# Patient Record
Sex: FEMALE | Race: WHITE | HISPANIC OR LATINO | URBAN - METROPOLITAN AREA
[De-identification: names, ages, dates, MRNs, and addresses within clinical notes are randomized per-mention and may not be internally consistent; named-entity substitution may affect disease eponyms.]

---

## 2024-06-24 DIAGNOSIS — G89.29 CHRONIC HEADACHE: Primary | ICD-10-CM

## 2024-06-24 DIAGNOSIS — G89.29 CHRONIC HEADACHES: Primary | ICD-10-CM

## 2024-06-24 DIAGNOSIS — R51.9 CHRONIC HEADACHES: Primary | ICD-10-CM

## 2024-06-24 DIAGNOSIS — R51.9 CHRONIC HEADACHE: Primary | ICD-10-CM

## 2024-07-08 ENCOUNTER — HOSPITAL ENCOUNTER (OUTPATIENT)
Dept: MRI IMAGING | Age: 44
Discharge: HOME | End: 2024-07-08
Attending: PSYCHIATRY & NEUROLOGY

## 2024-07-08 DIAGNOSIS — G89.29 CHRONIC HEADACHES: ICD-10-CM

## 2024-07-08 DIAGNOSIS — G89.29 CHRONIC HEADACHE: ICD-10-CM

## 2024-07-08 DIAGNOSIS — R51.9 CHRONIC HEADACHES: ICD-10-CM

## 2024-07-08 DIAGNOSIS — R51.9 CHRONIC HEADACHE: ICD-10-CM

## 2024-07-08 PROCEDURE — 70546 MR ANGIOGRAPH HEAD W/O&W/DYE: CPT

## 2024-07-08 PROCEDURE — A9585 GADOBUTROL INJECTION: HCPCS | Performed by: PSYCHIATRY & NEUROLOGY

## 2024-07-08 PROCEDURE — 10002805 HB CONTRAST AGENT: Performed by: PSYCHIATRY & NEUROLOGY

## 2024-07-08 PROCEDURE — 70553 MRI BRAIN STEM W/O & W/DYE: CPT

## 2024-07-08 RX ORDER — GADOBUTROL 604.72 MG/ML
7.5 INJECTION INTRAVENOUS ONCE
Status: COMPLETED | OUTPATIENT
Start: 2024-07-08 | End: 2024-07-08

## 2024-07-08 RX ADMIN — GADOBUTROL 7.5 ML: 604.72 INJECTION INTRAVENOUS at 15:19

## 2024-07-16 ENCOUNTER — LAB SERVICES (OUTPATIENT)
Dept: LAB | Age: 44
End: 2024-07-16

## 2024-07-16 DIAGNOSIS — G40.909 EPILEPSY, UNSPECIFIED, NOT INTRACTABLE, WITHOUT STATUS EPILEPTICUS  (CMD): Primary | ICD-10-CM

## 2024-07-16 LAB
ALBUMIN SERPL-MCNC: 3.8 G/DL (ref 3.6–5.1)
ALBUMIN/GLOB SERPL: 1.1 {RATIO} (ref 1–2.4)
ALP SERPL-CCNC: 66 UNITS/L (ref 45–117)
ALT SERPL-CCNC: 25 UNITS/L
ANION GAP SERPL CALC-SCNC: 7 MMOL/L (ref 7–19)
AST SERPL-CCNC: 21 UNITS/L
BASOPHILS # BLD: 0 K/MCL (ref 0–0.3)
BASOPHILS NFR BLD: 1 %
BILIRUB SERPL-MCNC: 0.8 MG/DL (ref 0.2–1)
BUN SERPL-MCNC: 16 MG/DL (ref 6–20)
BUN/CREAT SERPL: 26 (ref 7–25)
CALCIUM SERPL-MCNC: 9.1 MG/DL (ref 8.4–10.2)
CARBAMAZEPINE SERPL-MCNC: <0.5 MCG/ML (ref 4–12)
CHLORIDE SERPL-SCNC: 107 MMOL/L (ref 97–110)
CO2 SERPL-SCNC: 27 MMOL/L (ref 21–32)
CREAT SERPL-MCNC: 0.62 MG/DL (ref 0.51–0.95)
DEPRECATED RDW RBC: 40.9 FL (ref 39–50)
EGFRCR SERPLBLD CKD-EPI 2021: >90 ML/MIN/{1.73_M2}
EOSINOPHIL # BLD: 0.1 K/MCL (ref 0–0.5)
EOSINOPHIL NFR BLD: 2 %
ERYTHROCYTE [DISTWIDTH] IN BLOOD: 11.7 % (ref 11–15)
FASTING DURATION TIME PATIENT: ABNORMAL H
GLOBULIN SER-MCNC: 3.4 G/DL (ref 2–4)
GLUCOSE SERPL-MCNC: 81 MG/DL (ref 70–99)
HCT VFR BLD CALC: 39.8 % (ref 36–46.5)
HGB BLD-MCNC: 12.8 G/DL (ref 12–15.5)
IMM GRANULOCYTES # BLD AUTO: 0 K/MCL (ref 0–0.2)
IMM GRANULOCYTES # BLD: 0 %
LYMPHOCYTES # BLD: 1.4 K/MCL (ref 1–4.8)
LYMPHOCYTES NFR BLD: 30 %
MCH RBC QN AUTO: 30.8 PG (ref 26–34)
MCHC RBC AUTO-ENTMCNC: 32.2 G/DL (ref 32–36.5)
MCV RBC AUTO: 95.9 FL (ref 78–100)
MONOCYTES # BLD: 0.3 K/MCL (ref 0.3–0.9)
MONOCYTES NFR BLD: 6 %
NEUTROPHILS # BLD: 2.9 K/MCL (ref 1.8–7.7)
NEUTROPHILS NFR BLD: 61 %
NRBC BLD MANUAL-RTO: 0 /100 WBC
PLATELET # BLD AUTO: 243 K/MCL (ref 140–450)
POTASSIUM SERPL-SCNC: 4.1 MMOL/L (ref 3.4–5.1)
PROT SERPL-MCNC: 7.2 G/DL (ref 6.4–8.2)
RBC # BLD: 4.15 MIL/MCL (ref 4–5.2)
SODIUM SERPL-SCNC: 137 MMOL/L (ref 135–145)
WBC # BLD: 4.6 K/MCL (ref 4.2–11)

## 2024-07-16 PROCEDURE — 80156 ASSAY CARBAMAZEPINE TOTAL: CPT

## 2024-07-16 PROCEDURE — 85025 COMPLETE CBC W/AUTO DIFF WBC: CPT

## 2024-07-16 PROCEDURE — 80053 COMPREHEN METABOLIC PANEL: CPT

## 2024-07-16 PROCEDURE — 36415 COLL VENOUS BLD VENIPUNCTURE: CPT

## 2024-12-23 ENCOUNTER — TELEPHONE (OUTPATIENT)
Dept: OBGYN CLINIC | Facility: CLINIC | Age: 44
End: 2024-12-23

## 2024-12-23 DIAGNOSIS — N91.2 AMENORRHEA: Primary | ICD-10-CM

## 2024-12-23 DIAGNOSIS — O20.9 VAGINAL BLEEDING IN PREGNANCY, FIRST TRIMESTER (HCC): ICD-10-CM

## 2024-12-23 NOTE — TELEPHONE ENCOUNTER
Phoned patient to notify of lab order. States she will go in the next hour.     Monserrat Knight MD Rodarte, Patricia, RN    Ok to order beta hcg x2 and agree with ER precautions.

## 2024-12-23 NOTE — TELEPHONE ENCOUNTER
Patient calling to initiate prenatal care  LMP 11/6  Patient is 7-8 weeks on  1/1  Future Appointments   Date Time Provider Department Center   1/27/2025 12:30 PM [facility] US [facility] [address]   1/27/2025  3:00 PM Monserrat Knight MD [facility] [address]   2/28/2025  2:30 PM Diana Godinez APRN [facility] [place]       Confirmation of pregnancy appointment scheduled on   Insurance [facility]    Any history of ectopic pregnancy? no  Any history of miscarriage? 1 mc  Any medications that you are taking on a regular basis other than prenatal vitamins? no (if not taking prenatal vitamins, encourage patient to start taking.)  Any bleeding since the first day of last LMP and your positive pregnancy test? Some light spotting/bleeding started 1 week ago and three times since.

## 2024-12-23 NOTE — TELEPHONE ENCOUNTER
- Phoned patient who states she has experienced maroon blood noted when wiping 1-2 times both last week and again this week. Yesterday thicker clear fluid with maroon streaks x1.Denies pain and cramping. Notes excess nausea. States she did not experience any of this in previous pregnancies. ER precautions provided. Please advise.   -  us pended for approval.    Phoned number on file. Number belongs to patient's spouse as patient does not speak English. Spouse, Riley Roberson, provided patient's number so we may contact her directly. Updated to reflect her number.

## 2024-12-24 ENCOUNTER — TELEPHONE (OUTPATIENT)
Dept: OBGYN CLINIC | Facility: CLINIC | Age: 44
End: 2024-12-24

## 2024-12-24 LAB — HCG, TOTAL, QN: ABNORMAL MIU/ML

## 2024-12-24 NOTE — TELEPHONE ENCOUNTER
Pt's spouse is calling on his wife's behalf asking if there is anything she can take for her nausea and vomiting for two weeks. Pt spouse is aware about the upcoming appt for a  but is requesting if there is anything the pt can take prior. Wife only speaks Kyrgyz but the  prefers to be called instead. Please advise.

## 2024-12-24 NOTE — TELEPHONE ENCOUNTER
Returned call to patient to provide recommendations for nausea and vomiting in Japanese. ER precautions provided and patient to follow up if symptoms persist or worsen.     Reminded of need to repeat Hcg @ 48h.

## 2024-12-26 ENCOUNTER — TELEPHONE (OUTPATIENT)
Dept: OBGYN CLINIC | Facility: CLINIC | Age: 44
End: 2024-12-26

## 2024-12-26 DIAGNOSIS — O20.9 VAGINAL BLEEDING IN PREGNANCY, FIRST TRIMESTER (HCC): Primary | ICD-10-CM

## 2024-12-26 DIAGNOSIS — N91.2 AMENORRHEA: ICD-10-CM

## 2024-12-26 NOTE — TELEPHONE ENCOUNTER
New lab order placed for [facility]. Lab closed yesterday. Patient will go today.   Future Appointments   Date Time Provider Department Center   1/27/2025 12:30 PM [facility] US [facility] [address]   1/27/2025  3:00 PM Monserrat Knight MD [facility] [address]   2/28/2025  2:30 PM Diana Godinez APRN [facility] [place]

## 2024-12-26 NOTE — TELEPHONE ENCOUNTER
Pt's  called and pt has been told to have a 2nd HCG test 48 hrs after the 1st test (48 hrs was yesterday  and the lab was closed) went this morning and not order on file.

## 2024-12-27 LAB — HCG, TOTAL, QN: ABNORMAL MIU/ML

## 2024-12-30 ENCOUNTER — HOSPITAL ENCOUNTER (EMERGENCY)
Facility: HOSPITAL | Age: 44
Discharge: HOME | End: 2024-12-30
Attending: EMERGENCY MEDICINE
Payer: COMMERCIAL

## 2024-12-30 ENCOUNTER — APPOINTMENT (OUTPATIENT)
Dept: ULTRASOUND IMAGING | Facility: HOSPITAL | Age: 44
End: 2024-12-30
Attending: EMERGENCY MEDICINE
Payer: COMMERCIAL

## 2024-12-30 VITALS
RESPIRATION RATE: 16 BRPM | HEIGHT: 68.9 IN | HEART RATE: 72 BPM | WEIGHT: 154.31 LBS | SYSTOLIC BLOOD PRESSURE: 108 MMHG | BODY MASS INDEX: 22.85 KG/M2 | TEMPERATURE: 98 F | OXYGEN SATURATION: 100 % | DIASTOLIC BLOOD PRESSURE: 51 MMHG

## 2024-12-30 DIAGNOSIS — N89.8 VAGINAL DISCHARGE DURING PREGNANCY IN FIRST TRIMESTER (HCC): ICD-10-CM

## 2024-12-30 DIAGNOSIS — O46.8X1 SUBCHORIONIC HEMATOMA IN FIRST TRIMESTER, SINGLE OR UNSPECIFIED FETUS (HCC): ICD-10-CM

## 2024-12-30 DIAGNOSIS — O21.0 HYPEREMESIS GRAVIDARUM (HCC): Primary | ICD-10-CM

## 2024-12-30 DIAGNOSIS — O41.8X10 SUBCHORIONIC HEMATOMA IN FIRST TRIMESTER, SINGLE OR UNSPECIFIED FETUS (HCC): ICD-10-CM

## 2024-12-30 DIAGNOSIS — O26.891 VAGINAL DISCHARGE DURING PREGNANCY IN FIRST TRIMESTER (HCC): ICD-10-CM

## 2024-12-30 LAB
ALBUMIN SERPL-MCNC: 4.1 G/DL (ref 3.2–4.8)
ALBUMIN/GLOB SERPL: 1.7 {RATIO} (ref 1–2)
ALP LIVER SERPL-CCNC: 57 U/L
ALT SERPL-CCNC: 18 U/L
ANION GAP SERPL CALC-SCNC: 7 MMOL/L (ref 0–18)
AST SERPL-CCNC: 15 U/L (ref ?–34)
BASOPHILS # BLD AUTO: 0.04 X10(3) UL (ref 0–0.2)
BASOPHILS NFR BLD AUTO: 0.4 %
BILIRUB SERPL-MCNC: 0.5 MG/DL (ref 0.3–1.2)
BUN BLD-MCNC: 14 MG/DL (ref 9–23)
CALCIUM BLD-MCNC: 9.1 MG/DL (ref 8.7–10.4)
CHLORIDE SERPL-SCNC: 107 MMOL/L (ref 98–112)
CO2 SERPL-SCNC: 23 MMOL/L (ref 21–32)
CREAT BLD-MCNC: 0.63 MG/DL
EGFRCR SERPLBLD CKD-EPI 2021: 114 ML/MIN/1.73M2 (ref 60–?)
EOSINOPHIL # BLD AUTO: 0.14 X10(3) UL (ref 0–0.7)
EOSINOPHIL NFR BLD AUTO: 1.4 %
ERYTHROCYTE [DISTWIDTH] IN BLOOD BY AUTOMATED COUNT: 12.1 %
GLOBULIN PLAS-MCNC: 2.4 G/DL (ref 2–3.5)
GLUCOSE BLD-MCNC: 92 MG/DL (ref 70–99)
HCT VFR BLD AUTO: 37.5 %
HGB BLD-MCNC: 12.6 G/DL
IMM GRANULOCYTES # BLD AUTO: 0.09 X10(3) UL (ref 0–1)
IMM GRANULOCYTES NFR BLD: 0.9 %
LIPASE SERPL-CCNC: 36 U/L (ref 12–53)
LYMPHOCYTES # BLD AUTO: 1.72 X10(3) UL (ref 1–4)
LYMPHOCYTES NFR BLD AUTO: 16.7 %
MCH RBC QN AUTO: 31.1 PG (ref 26–34)
MCHC RBC AUTO-ENTMCNC: 33.6 G/DL (ref 31–37)
MCV RBC AUTO: 92.6 FL
MONOCYTES # BLD AUTO: 0.58 X10(3) UL (ref 0.1–1)
MONOCYTES NFR BLD AUTO: 5.6 %
NEUTROPHILS # BLD AUTO: 7.73 X10 (3) UL (ref 1.5–7.7)
NEUTROPHILS # BLD AUTO: 7.73 X10(3) UL (ref 1.5–7.7)
NEUTROPHILS NFR BLD AUTO: 75 %
OSMOLALITY SERPL CALC.SUM OF ELEC: 284 MOSM/KG (ref 275–295)
PLATELET # BLD AUTO: 260 10(3)UL (ref 150–450)
POTASSIUM SERPL-SCNC: 3.8 MMOL/L (ref 3.5–5.1)
PROT SERPL-MCNC: 6.5 G/DL (ref 5.7–8.2)
RBC # BLD AUTO: 4.05 X10(6)UL
RH BLOOD TYPE: POSITIVE
SODIUM SERPL-SCNC: 137 MMOL/L (ref 136–145)
WBC # BLD AUTO: 10.3 X10(3) UL (ref 4–11)

## 2024-12-30 PROCEDURE — 84702 CHORIONIC GONADOTROPIN TEST: CPT

## 2024-12-30 PROCEDURE — 85025 COMPLETE CBC W/AUTO DIFF WBC: CPT | Performed by: EMERGENCY MEDICINE

## 2024-12-30 PROCEDURE — 96374 THER/PROPH/DIAG INJ IV PUSH: CPT

## 2024-12-30 PROCEDURE — 83690 ASSAY OF LIPASE: CPT | Performed by: EMERGENCY MEDICINE

## 2024-12-30 PROCEDURE — 83690 ASSAY OF LIPASE: CPT

## 2024-12-30 PROCEDURE — 96361 HYDRATE IV INFUSION ADD-ON: CPT

## 2024-12-30 PROCEDURE — 99284 EMERGENCY DEPT VISIT MOD MDM: CPT

## 2024-12-30 PROCEDURE — 87591 N.GONORRHOEAE DNA AMP PROB: CPT | Performed by: EMERGENCY MEDICINE

## 2024-12-30 PROCEDURE — 99285 EMERGENCY DEPT VISIT HI MDM: CPT

## 2024-12-30 PROCEDURE — 87491 CHLMYD TRACH DNA AMP PROBE: CPT | Performed by: EMERGENCY MEDICINE

## 2024-12-30 PROCEDURE — 80053 COMPREHEN METABOLIC PANEL: CPT | Performed by: EMERGENCY MEDICINE

## 2024-12-30 PROCEDURE — 76801 OB US < 14 WKS SINGLE FETUS: CPT | Performed by: EMERGENCY MEDICINE

## 2024-12-30 PROCEDURE — 81514 NFCT DS BV&VAGINITIS DNA ALG: CPT | Performed by: EMERGENCY MEDICINE

## 2024-12-30 PROCEDURE — 86900 BLOOD TYPING SEROLOGIC ABO: CPT | Performed by: EMERGENCY MEDICINE

## 2024-12-30 PROCEDURE — 80053 COMPREHEN METABOLIC PANEL: CPT

## 2024-12-30 PROCEDURE — 86901 BLOOD TYPING SEROLOGIC RH(D): CPT

## 2024-12-30 PROCEDURE — 84702 CHORIONIC GONADOTROPIN TEST: CPT | Performed by: EMERGENCY MEDICINE

## 2024-12-30 PROCEDURE — 86901 BLOOD TYPING SEROLOGIC RH(D): CPT | Performed by: EMERGENCY MEDICINE

## 2024-12-30 PROCEDURE — 85025 COMPLETE CBC W/AUTO DIFF WBC: CPT

## 2024-12-30 PROCEDURE — 86900 BLOOD TYPING SEROLOGIC ABO: CPT

## 2024-12-30 RX ORDER — ONDANSETRON 4 MG/1
4 TABLET, ORALLY DISINTEGRATING ORAL EVERY 12 HOURS PRN
Qty: 20 TABLET | Refills: 0 | Status: SHIPPED | OUTPATIENT
Start: 2024-12-30 | End: 2025-01-06

## 2024-12-30 RX ORDER — DIPHENHYDRAMINE HYDROCHLORIDE 25 MG/1
25 CAPSULE ORAL DAILY
COMMUNITY

## 2024-12-30 RX ORDER — ONDANSETRON 2 MG/ML
4 INJECTION INTRAMUSCULAR; INTRAVENOUS ONCE
Status: COMPLETED | OUTPATIENT
Start: 2024-12-30 | End: 2024-12-30

## 2024-12-30 NOTE — TELEPHONE ENCOUNTER
I spoke with spouse, pt with him. Pt is having trouble keeping food & liquids down. Pt feels weak & that her heart is racing. She is not able to sleep. She is trying below recommendations already & no improvement. She is having some brown spotting. I advised spouse to take pt to IC or ED to be evaluated & possible IV fluids then call office to schedule ED/IC follow up appointment.I also informed him of latest HCG lab results. He expresses understanding.       Nausea and vomiting management in pregnancy     1. Half tablet of Unisom and 100 mg of Vitamin B6 at bedtime. Can repeat Vitamin B6 x1 in the morning or during the day  2. Small, frequent meals/snacks- especially important to include good proteins in your evening meal if you are feeling nauseated in the morning. This will keep your stomach full longer and reduce nausea.  3. Ginger tea, ginger ale, ginger chews, peppermint candies, hard candies  4. Keep a snack and or drink at your bedside to have before you even get out of bed in the morning  5. Sea Bands (https://www.sea-band.com/) these can be worn all day/night as needed. The correct spot to wear them is 3 finger widths down from where your wrist bends on the inside

## 2024-12-30 NOTE — TELEPHONE ENCOUNTER
Spoke with patient's spouse he says patient is still very weak, can't sleep, and having a lot of nausea. He is becoming very concerned and wanted to speak with clinical staff again to see what next steps could be.

## 2024-12-31 LAB
BV BACTERIA DNA VAG QL NAA+PROBE: NEGATIVE
C GLABRATA DNA VAG QL NAA+PROBE: NEGATIVE
C KRUSEI DNA VAG QL NAA+PROBE: NEGATIVE
C TRACH DNA SPEC QL NAA+PROBE: NEGATIVE
CANDIDA DNA VAG QL NAA+PROBE: NEGATIVE
N GONORRHOEA DNA SPEC QL NAA+PROBE: NEGATIVE
T VAGINALIS DNA VAG QL NAA+PROBE: NEGATIVE

## 2024-12-31 NOTE — ED PROVIDER NOTES
Patient Seen in: [facility] Emergency Department      History     Chief Complaint   Patient presents with    Abdomen/Flank Pain    Weakness    Pregnancy Issues     Stated Complaint: weakness    Subjective:   44-year-old female, G4, P2 at about 7 weeks by LMP, presents with her  for evaluation of some nausea and vomiting, cramping and spotting ongoing for several weeks.  Took a home pregnancy test a few weeks ago that was positive.  No prenatal care as of yet.              Objective:     No pertinent past medical history.            No pertinent past surgical history.              No pertinent social history.                Physical Exam     ED Triage Vitals [12/30/24 1925]   /66   Pulse 87   Resp 20   Temp 97.9 °F (36.6 °C)   Temp src Temporal   SpO2 99 %   O2 Device None (Room air)       Current Vitals:   Vital Signs  BP: 108/51  Pulse: 66  Resp: 16  Temp: 97.9 °F (36.6 °C)  Temp src: Temporal  MAP (mmHg): 68    Oxygen Therapy  SpO2: 100 %  O2 Device: None (Room air)        Physical Exam  Vitals and nursing note reviewed.   Constitutional:       Appearance: She is not toxic-appearing.   HENT:      Head: Normocephalic.   Cardiovascular:      Rate and Rhythm: Normal rate and regular rhythm.      Heart sounds: Normal heart sounds.   Pulmonary:      Effort: Pulmonary effort is normal. No respiratory distress.      Breath sounds: Normal breath sounds.   Abdominal:      Palpations: Abdomen is soft.      Tenderness: There is no abdominal tenderness. There is no guarding or rebound.   Skin:     General: Skin is warm and dry.   Neurological:      General: No focal deficit present.      Mental Status: She is alert.   Psychiatric:         Mood and Affect: Mood normal.         Behavior: Behavior normal.       Communication via  service.    ED Course     Labs Reviewed   CBC WITH DIFFERENTIAL WITH PLATELET - Abnormal; Notable for the following components:       Result Value    Neutrophil  Absolute Prelim 7.73 (*)     Neutrophil Absolute 7.73 (*)     All other components within normal limits   HCG, BETA SUBUNIT (QUANT PREGNANCY TEST) - Abnormal; Notable for the following components:    Hcg Quantitative >200,000.0 (*)     All other components within normal limits   COMP METABOLIC PANEL (14) - Normal   LIPASE - Normal   ABORH (BLOOD TYPE)   RAINBOW DRAW BLUE   VAGINITIS VAGINOSIS PCR PANEL   CHLAMYDIA/GONOCOCCUS, TIFFANY                   MDM      US PREG 1ST TRIMESTER (CPT=76801)    Result Date: 12/30/2024  CONCLUSION:   1. Single live intrauterine pregnancy with heart rate of 177 beats per minute an estimated gestational age of 8 weeks 4 days.  2. Small amount of subchorionic hemorrhage along the gestational sac measuring up to 13 x 9 mm.  3. 19 mm probable hemorrhagic corpus luteal cyst in left ovary.  4. Unremarkable right ovary.   Please see above for further details.  LOCATION:  [facility]    Dictated by (CST): Drew Bazan MD on 12/30/2024 at 10:00 PM     Finalized by (CST): Drew Bazan MD on 12/30/2024 at 10:01 PM        I independently interpreted the ultrasound of the pelvis and note  IUP    Differential diagnosis includes, but not limited to, threatened miscarriage, subchorionic hemorrhage, bacterial vaginosis, UTI     at bedside helpful to provide information  on history presenting illness    External chart review demonstrates alcoholic, with OB/GYN nurses noticed today    44-year-old female with some nausea and vomiting, mild, once or twice a day.  Given IV fluids.  Labs are stable.   service used for all communication.  Pelvic exam performed.  Scant amount of whitish-brown discharge which was cultured and sent to the lab.  GC sent as well.  Urine sent as well.  Ultrasound with subchorionic hemorrhage and other incidentals noted and discussed with  service.  She is well-appearing and nontoxic, feeling much better with Zofran and IV fluids.  Discussed risk,  benefits, tenderness, given OB/GYN follow-up as needed.  Discharge home, shared decision made utilized and return precaution provided    Patient was screened and evaluated during this visit.  As the treating physician attending to the patient, I determined within reasonable clinical confidence and prior to discharge, that an emergency medical condition was not or was no longer present.  There was no indication for further evaluation, treatment, or admission on an emergency basis.  Comprehensive verbal and written discharge and follow-up instructions were provided to help prevent relapse or worsening.  Patient was instructed to follow-up with their primary care provider for further evaluation and treatment, return immediately to ER for worsening, concerning, new, or changing/persisting symptoms. I discussed the case with the patient and they had no questions, complaints, or concerns.  Patient was comfortable going home.     Per the discharge paperwork, patients are encouraged to and given instructions on how to sign up for MyChart, where they have access to their records, including any/all incidental findings.     This note was prepared using Dragon Medical voice recognition dictation software. As a result errors may occur. When identified these errors have been corrected. While every attempt is made to correct errors during dictation discrepancies may still exist    Note to patient: The 21st Century Cures Act makes medical notes like these available to patients in the interest of transparency. However, this is a medical document intended as peer to peer communication. It is written in medical language and may contain abbreviations or verbiage that are unfamiliar. It may appear blunt or direct. Medical documents are intended to carry relevant information, facts as evident, and the clinical opinion of the practitioner.       Medical Decision Making      Disposition and Plan     Clinical Impression:  1. Hyperemesis  gravidarum (HCC)    2. Vaginal discharge during pregnancy in first trimester (HCC)    3. Subchorionic hematoma in first trimester, single or unspecified fetus (HCC)         Disposition:  Discharge  12/30/2024 11:28 pm    Follow-up:  [facility] Emergency Department  [address] [facility] [address]  [phone]  Follow up  As needed    Cristina Ayala  [address] [place] [id number]  [phone]    Follow up  As needed          Medications Prescribed:  Current Discharge Medication List        START taking these medications    Details   ondansetron 4 MG Oral Tablet Dispersible Take 1 tablet (4 mg total) by mouth every 12 (twelve) hours as needed for Nausea.  Qty: 20 tablet, Refills: 0                 Supplementary Documentation:

## 2024-12-31 NOTE — ED INITIAL ASSESSMENT (HPI)
Pt c/o nausea, 1x vomiting this am. She denies abd pain, pt denies diarrhea. States she is about 7 wks pregnant, reports brownish vaginal discharge. Pt denies urinary sxs. Seen at an Urgent Care, [facility], negative Covid/Flu/RSV

## 2025-01-27 ENCOUNTER — INITIAL PRENATAL (OUTPATIENT)
Dept: OBGYN CLINIC | Facility: CLINIC | Age: 45
End: 2025-01-27
Payer: COMMERCIAL

## 2025-01-27 VITALS
WEIGHT: 174 LBS | SYSTOLIC BLOOD PRESSURE: 116 MMHG | BODY MASS INDEX: 25.77 KG/M2 | DIASTOLIC BLOOD PRESSURE: 70 MMHG | HEIGHT: 68.9 IN | HEART RATE: 71 BPM

## 2025-01-27 DIAGNOSIS — Z34.92 SECOND TRIMESTER PREGNANCY (HCC): Primary | ICD-10-CM

## 2025-01-27 DIAGNOSIS — O09.529 ANTEPARTUM MULTIGRAVIDA OF ADVANCED MATERNAL AGE (HCC): ICD-10-CM

## 2025-01-27 DIAGNOSIS — Z98.891 HISTORY OF 2 CESAREAN SECTIONS: ICD-10-CM

## 2025-01-27 PROCEDURE — 87086 URINE CULTURE/COLONY COUNT: CPT | Performed by: OBSTETRICS & GYNECOLOGY

## 2025-01-27 RX ORDER — ONDANSETRON 8 MG/1
8 TABLET, ORALLY DISINTEGRATING ORAL EVERY 8 HOURS PRN
Qty: 30 TABLET | Refills: 3 | Status: SHIPPED | OUTPATIENT
Start: 2025-01-27

## 2025-01-27 RX ORDER — PNV NO.95/FERROUS FUM/FOLIC AC 28MG-0.8MG
1 TABLET ORAL DAILY
Qty: 30 TABLET | Refills: 11 | Status: SHIPPED | OUTPATIENT
Start: 2025-01-27 | End: 2025-02-26

## 2025-01-27 NOTE — PATIENT INSTRUCTIONS
General Instructions for Common Concerns in Pregnancy    The following instructions are recommended by the OB/GYNE Department at [facility]. If symptoms persist for more than 2-3 days, please contact the office for further assistance at [phone].    Symptoms of a cold: sneezing, coughing, headache, runny nose, watery eyes, slightly elevated temperature (under 100). A temperature over 100, with a productive cough (yellow-green mucus), needs evaluation by your primary care provider. There is no cure for the cold/flu... it takes time!  Rest Chicken soup Increase fluid intake   Cepacol, Sucrets lozenges, or Halls (no Zinc) Robitussin cough syrup, any kind Sudafed       Heartburn or Upset Stomach: *Liquid antacids are frequently more effective than tablets. Call the office if no relief. Avoid spicy food or any food that may cause symptoms.  Maalox or Maalox Plus Pepcid Tums   Mylanta ll, sodium free Zantac 75mg twice daily Tagamet   Milk of Magnesia          Headache or Mild aches and pains associated with colds or flu: Call office if persistent or severe.   Rest Tylenol or Extra Strength Tylenol   (Acetaminophen)                                                           Diarrhea(simple): Call office if persists more than 2 days or if accompanied by a fever.  Contact the office if you have been in a foreign country, if other family members have been ill, or if there is a possibility of contact with contaminated food/water prior to onset.  Minimum residue diet: No milk, fruits or vegetables other than peeled, cooked potatoes. Avoid spicy or greasy foods. You can have liquids, bread, noodles, rice, plain pasta, and tender cooked meat. Fish and poultry may be eaten as desired.  Kaopectate  Immodium A-D Lomotil       Constipation (with no nausea or vomiting): Increase fluids, fiber, and activity. Call with severe straining.  Milk of Magnesia Colace 50 mg 1-2 caps daily Prune Juice   Metamucil/Benefiber          Hemorrhoids: Call if severe bleeding or pain  Preparation H Anusol Suppositories Tucks           OVER THE COUNTER MEDICATION USE IN PREGNANCY    NO ASPIRIN, IBUPROFIN (ADVIL, MOTRIN, OR ALEVE), OR ZINC    The following over the counter (OTC) medications may be safely taken by pregnant women following all the directions on the package for adult usage.    Cold, flu, and allergy relief: Nasal congestion, cough, sneezing, runny nose, minor aches and pains, scratchy/sore throat  Benadryl Class B   Benadryl Cold or Allergy Sinus Class C   Claritin, Claritin D Class B   Chlor-trimeton Allergy Class B   Chlor-trimeton Allergy-sinus Class C   Comtrex Allergy Sinus/Multi Symptom Cough Class C   Contact (Severe cold and flu, Decongestant, Antihistamine, Day and Night Cold/Flu) Class C   Ocean Nasal Spray/Mist Class B   Robitussin CF, DM, PE/Robitussin Cough and Cold/Robitussin Cough, Cold, and Flu Class C   Sinutab Sinus Allergy Class C   Sudafed/Tavist- D Class C   Theraflu Cold and Cough/Flu and Cold Class C   Tylenol Regular and Extra strength Class B   Tylenol Cold, Multi-symptom, Cold Nighttime Class C   Vicks Formula 44, 44D, and 44E Class C     Gas:  Gas X, Mylicon Drops, Mylanta Gas Class C     Motion Sickness:  Dramamine/Dramamine ll Class B     Yeast Symptoms:  Monistat 7 Class B     Insomnia:  Sominex Class B       Morning sickness prevention and remedies:  Eat a piece of toast or a few crackers before you get out of bed in the morning ( place them by your bed the night before), or when you feel nauseated.   Get out of bed slowly and avoid sudden movements.   Eat smaller, more frequent meals during the day so your stomach does not remain empty for very long.   Eat high protein foods: eggs, cheese, nuts, meats,  ect, as well as fruits/fruit juices. These foods help prevent low levels of sugar in your blood, which can also cause nausea.   Sip spearmint, peppermint, ginger, or raspberry leaf tea. Ginger ale, 7 up  , or Sprite   Vitamin B6 25 mg/day to start; if not helpful, increase by 25 mg/day until taking 100 mg/day maximum.   Medication and Mother's Milk, 18th ED, 2019 and Physician's Desk Reference, 71st Ed, 2017    Medications Safe in Pregnancy  The following over-the-counter medications may be taken safely after 12 weeks gestation by any patient who is pregnant.  Please follow the instructions on the package for adult dosage.  If you experience any symptoms prior to 12 weeks, please call the office at [phone].      Colds/Congestion: Flonase, Saline Nasal Spray, Neti Pot, Plain Robitussin, Robitussin DM, Mucinex DM, Vicks 44 E, Vicks Vapor rub, Cough drops without Zinc or Sudafed.   Contact your doctor if you have a persistent fever over 100.4, shortness of breath, coughing up green mucus, or a sore throat that persists from more than 3 days    Diarrhea: Imodium, Maalox Anti-Diarrheal or Kaopectate  Contact the office if you have diarrhea for more than 3 days.    Allergies: Benadryl, Claritin or Zyrtec    Hemorrhoids: Tucks pads, Preparation H, Annusol, Sitz bath or Witch hazel  Eat a high fiber diet and drink plenty of fluids.    Yeast: Monistat 3 or 7  Call if your symptoms do not improve, or if you experience vaginal bleeding, or a watery discharge.    Constipation: Metamucil, Colace, fiber supplement, Milk of Magnesia or Dulcolax  Drink plenty of fluids, eat high-fiber foods and take the above laxatives sporadically.     Headache or Mild aches and pain: Extra Strength Tylenol     Gas: Gas X, Gelusil, Papaya Tablets, Maalox, Mylicon or Mylanta Gas    Heartburn: Tums, Mylanta, Pepcid AC or Maalox    Sore throat: Alcohol free Chloraseptic spray or Lozenges     Nausea and Vomiting: ½ tablet Unisom plus 100mg of Vitamin B6 at bedtime (may increase B6 up to 200mg per day), Willow root tea or raspberry leaf tea    Insomnia: Tylenol PM, Vitamin B6 50mg, warm bath or milk, Unisom, Nytol or Sominex.     We have listed a  few medications for common symptoms seen in pregnancy.  Please contact the office if you are unsure about any over the counter medications that are not listed above.      Foods to Avoid During Pregnancy  Deli Meats- You may eat deli meats from the deli.  If you eat deli meats in the package, it may be contaminated with Listeria. Heat all deli meats in a package to 165 degrees Fahrenheit before eating, even if the label states the meat is “pre-cooked”.    Soft Cheeses and Unpasteurized Products - You may eat soft cheeses that are pasteurized.  Please avoid all soft cheeses, milk or juice that is unpasteurized.  Fish- avoid fish that contains a high level of mercury. These include but are not limited to; [place], [place] Roughy, Tile Fish, Shark, Swordfish, and Mickey Mackerel. Please see chart below for good fish choices in pregnancy. Also avoid any raw, uncooked shellfish such as oysters, clams, or sushi.    Make sure to cook all meats; including ground beef, pork, and poultry to their desired temperatures before consuming. This reduces the chance of a food borne illness.  Caffeine- limit to 200 mg or an 8oz cup daily or less.   Alcohol- no amount of alcohol is determined to be safe in pregnancy. Do not drink any alcoholic beverages while pregnant.   The ADA and ACOG define patients at increased risk of type 2 diabetes based on: body mass index (BMI) >=25 kg/m2 (>=23 kg/m2 in  Americans) plus one or more of the following [2,28]:     GDM in a previous pregnancy.     Glycated hemoglobin >=5.7 percent (39 mmol/mol), impaired glucose tolerance, or impaired fasting glucose on previous testing.     First-degree relative with diabetes.     High-risk race/ethnicity (eg, , , ,  American, ).     History of cardiovascular disease.     Hypertension (>=140/90 mmHg) or on therapy for hypertension.     High-density lipoprotein cholesterol level <35 mg/dL (0.90 mmol/L)  and/or a triglyceride level >250 mg/dL (2.82 mmol/L).     Polycystic ovary syndrome (PCOS).     Physical inactivity.     Other clinical condition associated with insulin resistance (eg, severe obesity, acanthosis nigricans).           Candidates     Low-dose aspirin 81 mg: Take 2 tablets by mouth daily starting at 12 weeks until 36 weeks        Selecting high risk women for prophylaxis -- The greatest benefit appears to be in women at moderate to high risk of developing the disease [10,13-20], in whom a 62 percent reduction of  preeclampsia occurred in the ASPRE trial [21]. We recommend low-dose aspirin prophylaxis for women at high risk for preeclampsia. There is no consensus on the exact criteria that confer high risk. It is reasonable to use the USPSTF high-risk criteria, which are also endorsed by the American College of Obstetricians and Gynecologists (ACOG) [22,23]. The incidence of preeclampsia is estimated to be at least 8 percent for pregnant women with any one of these high risk factors:     ?Previous pregnancy with preeclampsia, especially early onset and with an adverse outcome.     ?Type 1 or 2 diabetes mellitus.     ?Chronic hypertension.     ?Multifetal gestation.     ?Kidney disease.     ?Autoimmune disease with potential vascular complications (antiphospholipid syndrome, systemic lupus erythematosus).        We generally follow the USPSTF criteria and recommend low-dose aspirin for preeclampsia prevention to patients with two or more of the following moderate risk factors [22]:     ?Nulliparity.     ?Obesity (body mass index >30 kg/m2).     ?Family history of preeclampsia in mother or sister.     ?Age >=35 years.     ?Sociodemographic characteristics (Black persons, lower income level [recognizing that these are not biological factors]).     ?Personal risk factors (eg, previous pregnancy with low birth weight or small for gestational age infant, previous adverse pregnancy outcome [eg,  stillbirth], interval >10 years between pregnancies).      [facility]- Prenatal Testing    The following information is designed to help you understand some of the optional tests that are available to you to screen for problems in your pregnancy.  Before considering any of these tests, you will need to consider the following questions:    [1] What would you do if an abnormality were detected?  If the answer is nothing, then you may decide to decline all testing.  [2] Would you be willing to undergo testing which might increase your risk of miscarriage, such as an amniocentesis or CVS (see below)?  [3] If you have the initial testing, and it indicates that there might be a problem, and you subsequently decide not to do invasive testing such as an amniocentesis, would you worry excessively through the remainder of the pregnancy?    The following tests are available to screen for problems in your pregnancy:      [1]  First Trimester Screening (also called Nuchal Thickness, Nuchal Translucency or NT)- This is an abdominal ultrasound done between 10 and 14 weeks by a perinatology specialist (Maternal Fetal Medicine, MFM) which measures the thickness of the skin behind your baby's neck.  Increased thickness of the skin in this area has been linked to an increased risk of genetic abnormalities like Down Syndrome.  A second visit may be required if the baby is not in the correct position.  The ultrasound results are combined with a finger stick blood test to increase the sensitivity of the test.  You will need to schedule an appointment with the Maternal Fetal Medicine office.  Address and phone number below:  Please verify insurance coverage:  CPT code: 24348 (haro) or 84632 (twins)  Diagnosis: Genetic Screening- Z36.8A  Maternal Fetal Medicine  Dr. Ramires, Dr. Suazo, Dr. Lawson, and Dr. Oscar  [address]  [place] [address]  Phone [phone]  Fax [phone]    [2] Cell-Free DNA  testing (NIPT)- This is a blood test done any time after 10-11 weeks which screens for genetic abnormalities like Down Syndrome.  It is greater than 98% sensitive but requires an amniocentesis for confirmation if abnormal.  It can also tell you the sex of the baby.  CPT code: 49146  Diagnosis code: Genetic screening- Z36.8A  Testing options:   Mikayla- preferred for [facility] patients or all patients.  Please call [phone] or go to [employer]/empower to review billing, prior authorizations or referrals  [id number]- Optional for all insurance plans except [facility].  Please call [employer] at [phone] or go to [employer]/patients/cost-#/ to review billing, prior authorizations, or referrals      [3]  Quad Screen (also called AFP-plus or Tetra Screen)-  This is a simple blood test which screens for both genetic abnormalities like Down Syndrome and neural tube defects (NTDs), such as spina bifida (an abnormal opening in the spine which can cause paralysis).  It is usually performed around 16-20 weeks.  If the Quad screen comes back abnormal, it does not mean that your baby has an abnormality.  It only means that there is an increased risk of an abnormality.  Additional testing such as a Level II Ultrasound or Amniocentesis may be recommended (see below).  This test is less accurate at picking up genetic abnormalities than the cell-free DNA test.  If you have test #1 or 2, then usually only the AFP part of the Quad screen would be performed to screen for NTD's (see below).  Please verify insurance coverage:  CPT code: 56955  Diagnosis code: Genetic screening- Z36.8A    [4]  Alpha Fetoprotein (AFP, MSAFP)- This is a simple blood test that screens for neural tube defects such as spina bifida (an abnormal opening in the spine).  It is usually performed around 16-20 weeks.  Additional testing such as a Level II ultrasound may be recommended for an abnormal test result.  Please verify insurance coverage:  CPT  code: [id number] Diagnosis code: Genetic Screening- Z36.8A    ---------------------------------------------------------------------------------------------------------------    Carrier screening:     [5] Cystic Fibrosis/SMA Carrier Screening- Cystic Fibrosis is a genetic disease which causes lung problems in the infant.  SMA (spinal muscular atrophy) is a genetic disease that affects the nerve cells of the spinal cord.  These genetic defects would need to be passed from both parents to the child.  A blood test is performed on the mother, and if her test is positive, then the father should also be tested. These tests can be done at any time in the pregnancy, usually in the first trimester with your initial OB labs.  All babies are screened for cystic fibrosis after birth.  Please verify insurance coverage:  Cystic Fibrosis CPT Code: 62003 Diagnosis code: Cystic Fibrosis screening- Z13.228  SMA CPT Code: 25422 Diagnosis code: Genetic Screening- Z36.8A or Testing for Genetic Disease Carrier- Z13.71    [6] Hemoglobinopathy carrier screening: The hemoglobinopathies are heterogeneous genetic disorders of hemoglobin (Hb) typically inherited in an autosomal recessive pattern. The clinical presentation ranges from asymptomatic in carriers to mild to severe disease in homozygotes and compound heterozygotes. At the severe end of the spectrum, hemoglobinopathies impact quality of life, require life-long care (typically with regular blood transfusions), and can shorten life expectancy. In the [place], the American College of Obstetricians and Gynecologists (ACOG) recommends carrier screening and counseling for genetic conditions, ideally before pregnancy. Firstline for hemoglobinopathy carrier screening, includes a CBC evaluating red cell indices in all pregnant individuals [17]. A hemoglobin electrophoresis (or other protein chemistry method) is performed in addition to a CBC if there is suspicion of hemoglobinopathy based on  ethnicity (, Mediterranean, , Southeast , or [place] descent) or if red cell indices show a low mean MCV or MCH. Characteristics other than ethnicity that are associated with a higher risk for an individual to be a hemoglobinopathy carrier include a history of chronic anemia or stillbirth, a relative with a hemoglobin structural variant or thalassemia, and consanguinity In 2022 ACOG updated its recommendations to offer universal hemoglobinopathy testing to people planning pregnancy or at the initial prenatal visit if no prior testing results are available for interpretation. This is based on a high frequency of a hemoglobinopathy trait, in particular S trait, in the [place] and noting that race and self-identified ethnicity are poor alternatives for genetics.  Hemoglobin electrophoresis: Send out lab to [facility].  CPT Code: 26034 or 52930 or 32811 Diagnosis code: Z13.0 Encounter screening for hematological disorder    Zimride Carrier Screening: A carrier screening panel is available that includes cystic fibrosis, spinal muscular atrophy, hemoglobinopathy and additional autosomal recessive or X-linked disorders.  A full review of the carrier screening panel was available via [employer] website. Please call [phone] or go to [employer]/empower to review billing, prior authorizations or referrals.       ---------------------------------------------------------------------------------------------------------------    [7]  Level II Ultrasound-  This is an abdominal ultrasound done at approximately 20-21 weeks by a perinatology specialist ([facility]) at [facility] which looks at many of the baby's internal structures.  Abnormalities in certain structures have been associated with an increased risk of genetic abnormalities.  It also screens for NTD's.  This ultrasound would replace the Level I Ultrasound that we normally perform at our office around the same time.    [8]   Amniocentesis-  During this procedure, a needle is passed through your abdominal wall to withdrawal some amniotic fluid from around the baby.  It screens for both genetic abnormalities and NTD's and is usually performed around 15-16 weeks.  This test has the highest accuracy for detecting genetic abnormalities, but there may be a small risk of miscarriage or other complications.  A similar procedure called Chorionic Villus Sampling (CVS) is performed by passing a catheter through the vagina to remove a small sample of tissue from the placenta (afterbirth).  CVS may have a higher risk of miscarriage and other rare complications compared to amniocentesis but can be performed earlier in pregnancy.  Amniocentesis is often recommended/offered if any of the previously mentioned tests come back abnormal.  A pamphlet is available if you would like more information about this option.  If you decide to have an amniocentesis, the other tests would be unnecessary.      The above information covers some of the basics.  Pamphlets on the Cell-Free DNA test, Quad Screen and Cystic Fibrosis test should be in your prenatal packet.  Your doctor will discuss this information in more detail, and feel free to ask additional questions.  Also, remember that all of these tests are optional, and will only be performed at your request.  Testing that needs to be done through the perinatologist's office may require 2-3 weeks lead time to schedule.

## 2025-01-27 NOTE — PROGRESS NOTES
[facility]  Obstetrics and Gynecology    Subjective:     Nancy Pena is a 44 year old  female presents with c/o secondary amenorrhea and positive pregnancy test.     The patient was recommended to return for further evaluation. The patient reports worsening nausea/vomiting resistant to reglan.     Patient's last menstrual period was 2024..     Pregnancy planned?: Yes  Pregnancy desired? Yes    Review of Systems:  General: no complaints per category. See HPI for additional information.   Breast: no complaints per category. See HPI for additional information.   Respiratory: no complaints per category. See HPI for additional information.   Cardiovascular: no complaints per category. See HPI for additional information.   GI: no complaints per category. See HPI for additional information.   : no complaints per category. See HPI for additional information.   Heme: no complaints per category. See HPI for additional information.     OB History:   OB History    Para Term  AB Living   4 2 2   1 1   SAB IAB Ectopic Multiple Live Births   1       1      # Outcome Date GA Lbr Gurpreet/2nd Weight Sex Type Anes PTL Lv   4 Current            3 2019        ND   2 Term 14    F Caesarean   RIKI   1 Term 12    F Caesarean None         Gyne History:     Patient's last menstrual period was 2024.      Meds:  Medications Ordered Prior to Encounter[1]    All:  Allergies[2]    PMH:  No past medical history on file.    PSH:  Past Surgical History:   Procedure Laterality Date    Foot surgery      Hc  section level ii         Objective:     Vitals:    25 1503   BP: 116/70   Pulse: 71   Weight: 174 lb (78.9 kg)   Height: 68.9\"         Body mass index is 25.77 kg/m².    General: AAO.NAD.   CVS exam: normal peripheral perfusion  Chest: non-labored breathing, no tachypnea   Abdominal exam: deferred   Pelvic exam: deferred     Imaging:    Ultrasound scan performed  transabdominally.     LMP 2024   11w5d by LMP;  13w1d by ultrasound     Viable intrauterine pregnancy   Dates not consistent with ultrasound.   Final AARON:  8/3/2025 by ultrasound on 2025 not consistent with LMP     Second trimester growth parameters used.   bpm.   Cervix appears normal. Normal ovaries bilaterally. No free fluid visualized.        Assessment:     Nancy Pena is a 44 year old  female who presents for secondary amenorrhea and positive pregnancy test      Plan:     Patient Active Problem List    Diagnosis    Advanced maternal age in multigravida (HCC)     [ ] Asprin  [ ] MFM/Level 2         History of 2  sections     [ ] scheduled repeat C/S   - Desires bilateral salpingectomy at time of repeat C/S   [ ] MFM/Level 2          Secondary amenorrhea due to positive pregnancy test   - Ultrasound reviewed and discussed with patient, refer above  - Pt counseled on safety, diet, exercise, caffiene, tobacco, ETOH, sexual activity, ER precautions, diet, exercise, appropriate otc medication use, substance abuse   - Counseled on taking a PNV with 0.4mg of folic acid   - genetic screening testing d/w patient and family, pt declines invasive diagnostic testing and considering first versus second trimester screening   - Carrier screening, neural tube defect screening and hemoglobinopathy screening reviewed and discussed with patient; information provided and patient considering options and recommended to request desired screening at follow-up visit  - advised to follow up to establish prenatal care   - SAB precautions provided   - d/w nausea and vomiting in pregnancy including vitamin B6 and unisom   - COVID 19 precautions provided, recommend vaccination and booster if/when applicable   -Travel precautions provided, patient advised to not travel beyond 36 weeks as long as the pregnancy remains low risk.  Advised not to travel beyond 28 weeks for high risk pregnancy. Recommend  compression socks, increase water intake and movement during traveling to prevent blood clots.    All of the findings and plan were discussed with the patient.  They note understanding and agreement with the plan of care.  All questions were answered to the best of my ability at this time.    Discussed with patient that there will not be further notification of normal or benign results other than receiving results on mychart. A [employer] message or telephone call will be placed by the physician and/or office staff if results are abnormal.       Total patient time was 30 minutes in evaluation, consultation, and coordination of care. This included face to face and non-face to face actions. The patient's questions and concerns that were addressed.     RTC in 1 week for NOB visit or sooner if needed     Monserrat Knight MD   [facility] - OBGYN      Note to patient and family   The 21st Century Cures Act makes medical notes available to patients in the interest of transparency.  However, please be advised that this is a medical document.  It is intended as gbuv-aj-zmeb communication.  It is written and medical language may contain abbreviations or verbiage that are technical and unfamiliar.  It may appear blunt or direct.  Medical documents are intended to carry relevant information, facts as evident, and the clinical opinion of the practitioner.          [1]   Current Outpatient Medications on File Prior to Visit   Medication Sig Dispense Refill    metoclopramide (REGLAN) 10 MG Oral Tab Take 1 tablet (10 mg total) by mouth 3 (three) times daily as needed. (Patient not taking: Reported on 1/27/2025) 30 tablet 0    Pyridoxine HCl (VITAMIN B-6) 25 MG Oral Tab Take 1 tablet (25 mg total) by mouth daily. (Patient not taking: Reported on 1/27/2025)       No current facility-administered medications on file prior to visit.   [2] No Known Allergies

## 2025-01-28 RX ORDER — METOCLOPRAMIDE 10 MG/1
10 TABLET ORAL 3 TIMES DAILY PRN
Qty: 30 TABLET | Refills: 0 | OUTPATIENT
Start: 2025-01-28

## 2025-01-28 NOTE — TELEPHONE ENCOUNTER
Last OV: 1/27/25  Dr. Knight  Last Refill Date: 12/31/24 #30 0 refills  Follow Up:  2/12/25 Dr. Garcia  Next Appt. As above    Reglan not working per pt. Per ov note 1/27/25  MELISSAY

## 2025-01-29 ENCOUNTER — TELEPHONE (OUTPATIENT)
Dept: OBGYN CLINIC | Facility: CLINIC | Age: 45
End: 2025-01-29

## 2025-01-29 RX ORDER — METOCLOPRAMIDE 10 MG/1
10 TABLET ORAL 3 TIMES DAILY PRN
Qty: 30 TABLET | Refills: 0 | Status: SHIPPED | OUTPATIENT
Start: 2025-01-29

## 2025-01-29 NOTE — TELEPHONE ENCOUNTER
Patient wanted to request the Metoclopramide prescription. She called her pharmacy and she was told it is not available. Patient says (using  help ) that she doesn't want to take the [place] medication. Please advise

## 2025-01-29 NOTE — TELEPHONE ENCOUNTER
12w0d   visit on 1/27/2025  Last refill date:   metoclopramide (REGLAN) 10 MG Oral Tab 30 tablet 0 12/31/2024 --   Sig:   Take 1 tablet (10 mg total) by mouth 3 (three) times daily as needed.     Next appt.: 2/12/2025     Patient would like to avoid Zofran.  Refill request sent to provider for approval.

## 2025-02-04 LAB
ABSOLUTE BASOPHILS: 29 CELLS/UL (ref 0–200)
ABSOLUTE EOSINOPHILS: 256 CELLS/UL (ref 15–500)
ABSOLUTE LYMPHOCYTES: 1453 CELLS/UL (ref 850–3900)
ABSOLUTE MONOCYTES: 409 CELLS/UL (ref 200–950)
ABSOLUTE NEUTROPHILS: 5154 CELLS/UL (ref 1500–7800)
BASOPHILS: 0.4 %
EOSINOPHILS: 3.5 %
GLUCOSE, GESTATIONAL SCREEN (50G)-130 CUTOFF: 122 MG/DL
HEMATOCRIT: 39.9 % (ref 35–45)
HEMOGLOBIN: 13.1 G/DL (ref 11.7–15.5)
LYMPHOCYTES: 19.9 %
MCH: 31.9 PG (ref 27–33)
MCHC: 32.8 G/DL (ref 32–36)
MCV: 97.1 FL (ref 80–100)
MONOCYTES: 5.6 %
MPV: 10.9 FL (ref 7.5–12.5)
NEUTROPHILS: 70.6 %
PLATELET COUNT: 241 THOUSAND/UL (ref 140–400)
RDW: 12.1 % (ref 11–15)
RED BLOOD CELL COUNT: 4.11 MILLION/UL (ref 3.8–5.1)
RUBELLA ANTIBODY (IGG): 2.24 INDEX
TSH W/REFLEX TO FT4: 1.28 MIU/L
WHITE BLOOD CELL COUNT: 7.3 THOUSAND/UL (ref 3.8–10.8)

## 2025-02-07 NOTE — TELEPHONE ENCOUNTER
Spoke to patient with assistance from [employer]  ID# [id number].    Patient recalls doctor recommending genetic testing due to risk factors given her age (44).  Does not recall which test was discussed.  New patient folder was given, but states there is no handout inside folder with genetic testing information.  Patient has upcoming JOHN appt on 2/12/2025.  Advised to review genetic testing options with provider at next visit.  The provider will then place orders for genetic testing.   Then we will contact her to provide information on how to complete testing.  Patient verbalized understanding.

## 2025-02-07 NOTE — TELEPHONE ENCOUNTER
Pt's spouse calling to speak with someone regarding genetic testing kit , as they have not received the kit yet. Pt can be reached with  on her cell num

## 2025-02-12 NOTE — TELEPHONE ENCOUNTER
Message  Received: Today  Tiffany Garcia MD  [facility] [place] Nurse  Patient requests Panorama with gender please.

## 2025-02-12 NOTE — PROGRESS NOTES
Nancy Odom is a 44 year old  at 15w3d here for initial prenatal visit with our group.  used.     Patient's last menstrual period was 2024.     8/3/2025, by 13wk Ultrasound      OB History    Para Term  AB Living   4 2 2 0 1 2   SAB IAB Ectopic Multiple Live Births   1 0 0 0 2      # Outcome Date GA Lbr Gurpreet/2nd Weight Sex Type Anes PTL Lv   4 Current            3 SAB 2019        ND   2 Term 14    F Caesarean   RIKI   1 Term 12    F Caesarean None  RIKI       No past medical history on file.     Past Surgical History:   Procedure Laterality Date    Foot surgery      Hc  section level ii          Social History     Socioeconomic History    Marital status:    Tobacco Use    Smoking status: Never    Smokeless tobacco: Never   Vaping Use    Vaping status: Never Used   Substance and Sexual Activity    Alcohol use: Not Currently     Comment: social    Drug use: Never        ROS:   Constitutional: Negative for activity change, appetite change, chills, fatigue, fever and unexpected weight change.   HEENT: Negative for headaches, vision changes, congestion, epistaxis.    Respiratory: Negative for shortness of breath and wheezing.    Cardiovascular: Negative for chest pain or palpitations.   Gastrointestinal: Negative for vomiting, abdominal pain, and abdominal distention.   Genitourinary: Negative for dysuria, frequency, hematuria, vaginal discharge or bleeding, difficulty urinating, genital sores, pain  Skin: Negative for wound.   Neurological: Negative for dizziness, seizures, numbness and headaches.   Psychiatric/Behavioral: Negative for depression, anxiety, SI/HI.       Physical Exam:  Vitals:    25 1138   BP: 122/70   Pulse: 102      Body mass index is 27.06 kg/m².     HEENT: NC/AT, no palpable mass or enlargement of thyroid  Breast:  bilaterally symmetric, no mass or tenderness to palpation, no nipple discharge  Abdomen:  soft, NT, ND  Pelvic:   Vulva: Normal.   Vagina: Estrogenized.  Good support. No cystocele or rectocele.   Cervix: No lesions or tenderness.     Uterus: 15 week size, firm, mobile, nontender.     Adnexa: No masses or tenderness.     Rectal: Anus and perineum are normal.      FHR: 145      A/P:  Patient Active Problem List   Diagnosis    Advanced maternal age in multigravida (HCC)    History of 2  sections        Prenatal course and care discussed with patient: visits, routine labs and US, plan for delivery. Prenatal labs ordered today.  Genetic screening options discussed:  NIPT with cell-free DNA, first trimester screen, or diagnostic testing. Additionally offered carrier screening. She desires NIPT and level II with MFM.   Immunizations: tdap >27wks  Breast and cervical cancer screening completed - pap collected  Provided with educational information on dietary restrictions, exercise during pregnancy, and goal total weight gain. We reviewed avoidance of exposures to tobacco/marijuana, alcohol, and illicit substances.   Co-morbidities:  AMA - referral to MFM, plan for antepartum testing  H/o prior CS x2 - plan for repeat  Desires permanent sterilization    Follow up visit in 2 weeks, sooner prn.

## 2025-02-13 NOTE — TELEPHONE ENCOUNTER
I called pt using  ID # [id number]:    AARON 08/03/2025 - currently GA: 15w4d    Patient desires NIPT testing- would like gender reported.  Order placed for Panorama.  Patient aware that Mikayla will send her a link by text or email to schedule mobile phlebotomy and ship a kit to her home.     Order Number: [id number]

## 2025-02-26 NOTE — TELEPHONE ENCOUNTER
[facility] and copied into Epic.  Results in provider's In-Basket for review.

## 2025-02-26 NOTE — TELEPHONE ENCOUNTER
[facility] Results placed in [place] RN In-Bin

## 2025-02-27 NOTE — PROGRESS NOTES
Return OB <20 WGA      GA 17w4d  Vitals:    25 1007   BP: 118/72   Pulse: 78   Weight: 180 lb 12.8 oz (82 kg)       Doing well. No complaints.   Denies abdominal/pelvic pain or vaginal bleeding.   Rh positive   Genetic screening normal NIPT Female  AFP ordered  Recommend Anatomy scan 20 wks w/ MFM  Prenatal labs reviewed     Patient Active Problem List    Diagnosis    Request for sterilization    Advanced maternal age in multigravida (HCC)     [X] NIPT low risk  [ ] Asprin  [ ] MFM/Level 2         History of 2  sections     [ ] scheduled repeat C/S   - Desires bilateral salpingectomy at time of repeat C/S   [ ] MFM/Level 2              RTC in 4 weeks           Note to patient and family   The 21st Century Cures Act makes medical notes available to patients in the interest of transparency.  However, please be advised that this is a medical document.  It is intended as mgec-ha-ydtp communication.  It is written and medical language may contain abbreviations or verbiage that are technical and unfamiliar.  It may appear blunt or direct.  Medical documents are intended to carry relevant information, facts as evident, and the clinical opinion of the practitioner.    Charles Maria MD

## 2025-03-19 NOTE — PROGRESS NOTES
Outpatient Maternal-Fetal Medicine Consultation    Dear Dr. Knight    Thank you for requesting ultrasound evaluation and maternal fetal medicine consultation on your patient Nancy Odom.  As you are aware she is a 44 year old female  with a haro pregnancy and an Estimated Date of Delivery: 8/3/25.  A maternal-fetal medicine consultation was requested secondary to Advanced Maternal Age.  Her prenatal records and labs were reviewed.    2 prior pregnancies were uncomplicated.  She has had 2 cesareans.  ROS    HISTORY  OB History    Para Term  AB Living   4 2 2 0 1 2   SAB IAB Ectopic Multiple Live Births   1 0 0 0 2      # Outcome Date GA Lbr Gurpreet/2nd Weight Sex Type Anes PTL Lv   4 Current            3 SAB         ND   2 Term 14    F Caesarean   RIKI   1 Term 12    F Caesarean None  RIKI       Allergies:  Allergies[1]   Current Meds:  Current Outpatient Medications   Medication Sig Dispense Refill    Prenatal Vit-Fe Fumarate-FA (PRENATAL VITAMINS) 28-0.8 MG Oral Tab Take 1 tablet by mouth daily. 30 tablet 11    Dextromethorphan-guaiFENesin (ROBITUSSIN DM OR) Take by mouth.      METOCLOPRAMIDE 10 MG Oral Tab TAKE 1 TABLET BY MOUTH THREE TIMES A DAY AS NEEDED (Patient not taking: Reported on 2025) 30 tablet 0    ondansetron 8 MG Oral Tablet Dispersible Take 1 tablet (8 mg total) by mouth every 8 (eight) hours as needed for Nausea. (Patient not taking: Reported on 2025) 30 tablet 3    Pyridoxine HCl (VITAMIN B-6) 25 MG Oral Tab Take 1 tablet (25 mg total) by mouth daily. (Patient not taking: Reported on 2025)          HISTORY:  No past medical history on file.   Past Surgical History:   Procedure Laterality Date    Foot surgery      Hc  section level ii        Family History   Problem Relation Age of Onset    Cancer Father         prostate    Diabetes Mother       Social History     Socioeconomic History    Marital status:    Tobacco  Use    Smoking status: Never    Smokeless tobacco: Never   Vaping Use    Vaping status: Never Used   Substance and Sexual Activity    Alcohol use: Not Currently     Comment: social    Drug use: Never          PHYSICAL EXAMINATION:  /66 (BP Location: Right arm, Patient Position: Sitting, Cuff Size: adult)   Pulse 77   Ht 5' 8\" (1.727 m)   Wt 188 lb (85.3 kg)   LMP 11/06/2024   BMI 28.59 kg/m²   Physical Exam  Constitutional:       Appearance: Normal appearance.   Abdominal:      Palpations: Abdomen is soft.      Tenderness: There is no abdominal tenderness.   Neurological:      Mental Status: She is alert.   Psychiatric:         Mood and Affect: Mood normal.         Behavior: Behavior normal.           OBSTETRIC ULTRASOUND  The patient had a level II ultrasound today which revealed size consistent with dates and a normal detailed anatomic survey. and The patient had a cervical length assessment today with a normal cervical length without cervical funneling.  Ultrasound Findings:  Single IUP in cephalic presentation.    Placenta is posterior, low lying placenta.   A 3 vessel cord is noted.  Cardiac activity is present at 144 bpm   g ( 0 lb 11 oz);   MVP is 5 cm .     The fetal measurements are consistent with the established EDC. No ultrasound evidence of structural abnormalities are seen today. The nasal bone is present. No ultrasound evidence of markers for aneuploidy are seen. She understands that ultrasound exam cannot exclude genetic abnormalities and that genetic testing is recommended. The limitations of ultrasound were discussed.     The cervix was not able to be clearly visualized and appeared short by transabdominal ultrasound, therefore transvaginal ultrasound was performed. Cervix is closed, long and no funneling seen measuring 46.7 mm     Uterus and adnexa appeared normal  today on US  See PACS/Imaging Tab For Complete Ultrasound Report  I interpreted the results and reviewed them with  the patient.    DISCUSSION  During her visit we discussed and reviewed the following issues:  ADVANCED MATERNAL AGE    Background  I reviewed with the patient that pregnancies in women of advanced maternal age (35 or older at delivery) are associated with elevated risks. Specifically, there is a higher rate of:  Fetal malformations  Preeclampsia  Gestational diabetes  Intrauterine fetal death    As a result, enhanced pregnancy surveillance is advised for these patients including a comprehensive ultrasound to assess for fetal malformations (at 20 weeks) and a third trimester ultrasound assessment for fetal growth (at 32 weeks). In addition, weekly NST's (initiating at 36 weeks gestation for women 35-39 years and at 32 weeks gestation for women 40 years and older) are also advised. Routine obstetric care is more than adequate to assess for gestational diabetes and preeclampsia; hence, no further significant alterations in obstetric care are advised.    Medical Complications    Women 35 years of age or older can expect to experience two to three fold higher rates of hospitalization,  delivery, and pregnancy-related complications when compared to their younger counterparts.  The two most common medical problems complicating these  pregnanccies are hypertension and diabetes.   The incidence of preeclampsia in the general obstetric population is 3 to 4 percent; this increases to 5 to 10 percent in women over age 40 and is as high as 35 percent in women over age 50.   The incidence of gestational diabetes in the general obstetric population is 3 percent, rising to 7 to 12 percent in women over age 40 and 20 percent in women over age 50.  Women 35 years of age or older are more likely to be delivered by . The  delivery rate in the general obstetric population of the [place] is almost 30 percent, compared to almost 50 percent in women over age 40 to 45 and almost 80 percent in women age 50 to  63.          Fetal Death        A decision analysis tool using data from the [place] [employer]  Database predicted a strategy of weekly antepartum testing and labor induction would lower the risk of unexplained fetal death in women 35 years of age or older. In this model, weekly testing starting at 36 weeks of gestation would drop the risk of fetal death from 5.2 to 1.3 per 1000 pregnancies. While a policy of antepartum testing in older women does increase the chance that a women will be induced (71 inductions per fetal death averted) and thereby increases her risk of having a  delivery, only 14 additional cesareans would need to be performed to avert one unexplained fetal death.  Hence, weekly NST's are advised for women of advanced maternal age; testing should be initiated at 36 weeks for women 35-39 years and at 32 weeks for women 40 years and older.    Fetal Malformations    Cardiac malformations, clubfoot, and diaphragmatic hernia appear to occur with increased frequency in offspring of older women. These abnormalities are structural and unrelated to aneuploidy, thus they would not be detected by karyotype analysis.  For these reasons a complete, detailed ultrasound (level II) is advised even if the fetus has a normal karyotype.      Fetal Aneuploidy      Invasive Testing  I offered invasive genetic testing (amniocentesis, chorionic villus sampling) after reviewing the diagnostic accuracy of these tests as well as the procedure associated loss rate (1:500 for genetic amniocentesis).    She ultimately does not desire invasive genetic testing.     We discussed  the increased risk of chromosomal abnormalities associated with advanced maternal age at age  45 year old. She understands that ultrasound exam cannot exclude potential genetic abnormalities.  Her estimated risk based on maternal age at term with any chromosome abnormality is about 1:  30 and with Down Syndrome is about 1: 45.   We also  discussed the risks and benefits of having  genetic testing (CVS and amniocentesis) performed.      Non-invasive Pregnancy Testing (NIPT)  I reviewed current non-invasive screening options. Currently non-invasive pregnancy testing (NIPT) offers the highest detection rate (with the lowest false positive rate) for the detection of fetal aneuploidy amongst high-risk patients. The limitations of detailed mid-trimester sonography was reviewed with the patient. First trimester screening and second trimester multiple-marker serum serum screening as alternative aneuploidy screening options were also reviewed. However, both of these tests are associated with lower detection and higher false positive rates.          Prevention of Preeclampsia    Antiplatelet Agents  The observation that preeclampsia is associated with increased platelet turnover and increased platelet-derived thromboxane levels led to randomized trials evaluating low-dose aspirin therapy in women thought to be at increased risk of the disease. As opposed to higher dose aspirin therapy, low-dose aspirin (60 to 150 mg per day) diminishes platelet thromboxane synthesis while maintaining vascular wall prostacyclin synthesis. Although not well studied, the beneficial effect of low-dose aspirin for prevention of preeclampsia may also be in part related to modulation of inflammation. The drug has been studied for both prevention of preeclampsia and prevention of progression from mild to severe disease. It appears to result in a modest reduction in risk of preeclampsia when given to women at moderate to high risk of preeclampsia.  This approach has been studied in over 35,000 women, for both prevention of preeclampsia and prevention of progression of the disease. Low dose aspirin has a modest impact on pregnancy outcome; it reduces the risk of preeclampsia, as well as other adverse pregnancy outcomes (eg,  delivery, fetal growth restriction) by about 10 to 20  percent. Low dose aspirin is safe in pregnancy; thus, it is a reasonable strategy in women with a moderate to high risk of preeclampsia in whom the benefits outweigh the risks.     Clinical Guidelines  Based on the available data, low-dose aspirin is advised for women at high risk for preeclampsia. There is no consensus on the criteria that confer high risk. The [place] Preventive Services Task Force (USPSTF) risk criteria are reasonable.  USPSTF criteria for high risk include one or more of the following:   Previous pregnancy with preeclampsia, especially early onset and with an adverse outcome   Multifetal gestation  Chronic hypertension   Type 1 or 2 diabetes mellitus  Chronic kidney disease  Autoimmune disease (antiphospholipid syndrome, systemic lupus erythematosus)     Women with multiple moderate risk factors for preeclampsia are considered high risk, as well. Identification of women with an appropriate combination of moderate risk factors to be considered high risk is subjective and determined case by case, as the data describing the magnitude of the association between each of these risk factors and development of preeclampsia vary widely and lack consistency. USPSTF criteria for moderate risk include:  Nulliparity  Obesity (body mass index >30 kg/m2)  Family history of preeclampsia in mother or sister  Age >=35 years  Sociodemographic characteristics (, low socioeconomic level)  Personal risk factors (eg, history of low birth weight or small for gestational age, previous adverse pregnancy outcome, >10 year pregnancy interval)     If used, daily low-dose aspirin should be initiated in the 12th or 13th week of gestation, although adverse effects from earlier initiation (eg, preconception) have not been documented. The optimum low dose is unclear; 81 mg is readily available, but 100 or 150 mg (which are not available in some countries including the [place] may be more  effective.  In some pregnant women, platelet function is not inhibited by the 81 mg dose but is altered by higher dosing. Hence, current evidence has suggested a daily dose of 100 to 150 mg of aspirin rather than a lower dose. In the [place], this can be achieved by taking one and one-half 81 mg tablets; however, taking one 81 mg tablet is also reasonable since this is the commercially available dose and has proven efficacy. For prevention of preeclampsia, no trials have evaluated the efficacy of a higher dose of aspirin, which could be achieved easily by taking two 81 mg tablets or splitting a 325 mg tablet. For prevention of myocardial infarction and stroke in nonpregnant individuals, aspirin appears to be equally effective at doses between 75 and 325 mg per day.  The safety of low-dose aspirin use in the second and third trimesters is well established, but questions linger regarding use in the first trimester (possible increase in minor vaginal bleeding or gastroschisis). Early therapy (before 16 weeks) may be important since the pathophysiologic features of preeclampsia develop at this time, weeks before clinical disease is apparent. However, available evidence is inconsistent about the importance of early initiation of therapy, possibly because aspirin has major effects on prostacyclin production and endothelial function throughout gestation.  Aspirin is discontinued 5 to 10 days before expected delivery to diminish the risk of bleeding during delivery; however, no adverse maternal or fetal effects related to low-dose aspirin have been proven.  Major questions remain as to which, if any, subgroups of women are more likely to benefit from low-dose aspirin therapy, when treatment should be started (first or second trimester), and the optimum dose to inhibit placental PGH synthase (cyclooxygenase) and also allow the anti-inflammatory effects of low-dose aspirin.        IMPRESSION:  IUP at 20w3d   AMA --NIPT low risk,  declines invasive testing   Prior  x 2  Poor pregnancy history  Low lying placenta    RECOMMENDATIONS:  Continue care with Dr. Knight  Follow-up Growth ultrasound with BPP at 32 weeks.  Weekly NST's at 36 weeks.  Low-dose aspirin 81 mg daily          Thank you for allowing me to participate in the care of your patient.  Please do not hesitate to contact me if additional questions or concerns arise.      Yoly Lawson M.D.    40 minutes spent in review of records, patient consultation, documentation and coordination of care.  The relevant clinical matter(s) are summarized above.     Note to patient and family  The 21st Century Cures Act makes medical notes available to patients in the interest of transparency.  However, please be advised that this is a medical document.  It is intended as qmcz-hg-eolo communication.  It is written and medical language may contain abbreviations or verbiage that are technical and unfamiliar.  It may appear blunt or direct.  Medical documents are intended to carry relevant information, facts as evident, and the clinical opinion of the practitioner.         [1] No Known Allergies

## 2025-03-19 NOTE — PROGRESS NOTES
Pt here for Level II Ultrasound  +fm noted per patient  Pt noted vaginal pressure, pt denies bleeding, LOF, uc's.   Pt French speaking, pt understanid RN questions, spouse speaks English well.

## 2025-03-21 NOTE — PROGRESS NOTES
[facility]  Obstetrics and Gynecology  Routine OB visit Progress Note    Subjective:     Nancy Odom is a 44 year old  at 20w5d who presents for routine OB visit.  Patient reports doing well.   Denies contractions, vaginal bleeding or leakage of fluid.  +FM    Review of Systems:  General: no complaints per category. See HPI for additional information.   Breast: no complaints per category. See HPI for additional information.   Respiratory: no complaints per category. See HPI for additional information.   Cardiovascular: no complaints per category. See HPI for additional information.   GI: no complaints per category. See HPI for additional information.   : no complaints per category. See HPI for additional information.   Heme: no complaints per category. See HPI for additional information.     History/Other:     Obstetrical History:   OB History    Para Term  AB Living   4 2 2 0 1 2   SAB IAB Ectopic Multiple Live Births   1 0 0 0 2      # Outcome Date GA Lbr Gurpreet/2nd Weight Sex Type Anes PTL Lv   4 Current            3 2019        ND   2 Term 14    F Caesarean   RIKI   1 Term 12    F Caesarean None  RIKI         Gynecological History:     Patient's last menstrual period was 2024.      Medications:   prenatal vitamin with DHA 27-0.8-228 MG Oral Cap Take 1 capsule by mouth daily.      aspirin 81 MG Oral Tab EC Take 1 tablet (81 mg total) by mouth daily.          Allergies:  Allergies[1]    Past Medical History:  History reviewed. No pertinent past medical history.    Past Surgical History:  Past Surgical History:   Procedure Laterality Date    Foot surgery      Hc  section level ii         Immunizations:   Immunization History   Administered Date(s) Administered    Covid-19 Vaccine Pfizer 30 mcg/0.3 ml 2021           Objective:     Objective:       Vitals:    25 0906   BP: 114/62   Pulse: 77   Weight: 188 lb (85.3 kg)   Height:  68\"         Body mass index is 28.59 kg/m².    General: AAO.NAD.   CVS exam: normal peripheral perfusion  Chest: non-labored breathing, no tachypnea   Abdominal exam: gravid   Pelvic exam: deferred      Labs:  Prenatal Results       Initial Prenatal Labs EDWARD (GA 0-24w)       Test Value Reference Range Date Time    ABO Group  O   02/01/25 0632    RH Type  RH(D) POSITIVE   02/01/25 0632    Antibody Screen OB  NO ANTIBODIES DETECTED   02/01/25 0632    Rubella Titer OB  2.24 Index  02/01/25 0632    Hep B Surf Ag OB  NON-REACTIVE  NON-REACTIVE 02/01/25 0632    RPR Serology  NON-REACTIVE  NON-REACTIVE 02/01/25 0632    TREP        HIV Combo Result OB  NON-REACTIVE  NON-REACTIVE 02/01/25 0632    HGB  13.1 g/dL 11.7 - 15.5 02/01/25 0632       12.6 g/dL 12.0 - 16.0 12/30/24 2007    HCT  39.9 % 35.0 - 45.0 02/01/25 0632       37.5 % 35.0 - 48.0 12/30/24 2007    MCV  97.1 fL 80.0 - 100.0 02/01/25 0632       92.6 fL 80.0 - 100.0 12/30/24 2007    Platelets  241 Thousand/uL 140 - 400 02/01/25 0632       260.0 10(3)uL 150.0 - 450.0 12/30/24 2007    Urine Culture  No Growth at 18-24 hrs.   01/27/25 1543    HCV  NON-REACTIVE  NON-REACTIVE 02/01/25 0632              Additional Prenatal Labs (GA 0-24w)       Test Value Reference Range Date Time    Chlamydia with Pap  Negative  Negative 12/30/24 2233    GC with Pap  Negative  Negative 12/30/24 2233    Chlamydia        GC         Pap Smear  Negative for intraepithelial lesion or malignancy  Negative for intraepithelial lesion or malignancy 02/12/25 1232    HPV  Negative  Negative 02/12/25 1232    Sickel Cell Solubility HGB                  2nd Trimester Labs (GA 24-41w)       Test Value Reference Range Date Time    Antibody Screen OB        Serology RPR        HGB        HCT        Glucose 1 hour  122 mg/dL <135 02/01/25 0632    Glucose Lorin 3 hr Gestational Fasting        1 Hour glucose        2 Hour glucose        3 hour glucose        Ferritin                  3rd Trimester (28-41w)        Test Value Reference Range Date Time    Blood Type        Antibody screen        Group B Strep OB        HGB        HCT        HIV Combo Result OB        Rapid HIV        Ferritin                  First Trimester & Genetic Testing (GA 0-40w)       Test Value Reference Range Date Time    MaternaT-21 (T13)        MaternaT-21 (T18)        MaternaT-21 T(T21)        VISIBILI T (T21)        VISIBILI T (T18)        QNatal T13        QNatal T18        QNatal T21        Cystic Fibrosis Screen [32]        Cystic Fibrosis Screen [165]        Cystic Fibrosis Screen [165]        Cystic Fibrosis Screen [165]        Cystic Fibrosis Screen [165]        RH d (Embanet)        CVS        Nuchal Screening        NIPT [T13]        NIPT [T18]        NIPT [T21]        Carrier Screen        First Trimester Screen        Mikayla - NIPT [T13]        Mikayla - NIPT [T18]        Mikayla - NIPT [T21]        Mikayla - Carrier Screen                  Genetic Screening (GA 0-45w)       Test Value Reference Range Date Time    AFP Tetra-Patient's HCG        AFP Tetra-Mom for HCG        AFP Tetra-Patient's UE3        AFP Tetra-Mom for UE3        AFP Tetra-Patient's LORY        AFP Tetra-Mom for LORY        AFP Tetra-Patient's AFP        AFP Tetra-Mom for AFP        AFP. Spina Bifida        Quad Screen ([facility])        AFP  *Screen Negative*   25 1035    SMA                  Legend    ^: Historical                                Assessment & Plan:     Nancy Odom is a 44 year old  at 20w5d who presents for routine OB visit. Overall doing well.     Problem List Items Addressed This Visit    None  Visit Diagnoses       20 weeks gestation of pregnancy (HCC)    -  Primary    Relevant Medications    aspirin 81 MG Oral Tab EC    Other Relevant Orders    CBC (with DIFF, Platelet) Reflex to Ferritin    Glucose 1 HR OB          Patient Active Problem List    Diagnosis    Low-lying placenta (HCC)     Precautions given, pelvic  rest, repeat at 32 wga       Request for sterilization     Sterilization papers given 3/21/2025. Pt to bring back signed      Advanced maternal age in multigravida (HCC)     [X] NIPT low risk  [ ] Asprin  [ X ] MFM/Level 2   - Follow-up Growth ultrasound with BPP at 32 weeks.  - Weekly NST's at 36 weeks.         History of 2  sections     [ ] scheduled repeat C/S -->request sent   - Desires bilateral salpingectomy at time of repeat C/S   [x ] MFM/Level 2            Doing well  No complaints.  Denies LOF/VB/uctx  RH positive  Genetic testing nl NIPT, AFP normal   Anatomy Scan w/ low lying placenta-->repeat at 32 wga  CBC and 1 hr GTT order and instructions provided      All of the findings and plan were discussed with the patient.  She notes understanding and agrees with the plan of care.  All questions were answered to the best of my ability at this time.      Total patient time was 15 minutes in evaluation, consultation, and coordination of care. This included face to face and non-face to face actions. The patient's questions and concerns were addressed.       Charles Maria MD  [facility] - OBGYN        Note to patient and family   The 21st Century Cures Act makes medical notes available to patients in the interest of transparency.  However, please be advised that this is a medical document.  It is intended as ewge-al-xicy communication.  It is written and medical language may contain abbreviations or verbiage that are technical and unfamiliar.  It may appear blunt or direct.  Medical documents are intended to carry relevant information, facts as evident, and the clinical opinion of the practitioner.      This note could include assistance by Dragon voice recognition. Errors in content may be related to improper recognition by the system; efforts to review and correct have been done but errors may still exist.          [1] No Known Allergies

## 2025-03-21 NOTE — TELEPHONE ENCOUNTER
----- Message from Charles Maria sent at 3/21/2025 10:02 AM CDT -----  CS Request: RCS and bilateral salpingectomy  Request CS at/between this GA: 39 wks  Estimated Date of Delivery: 8/3/25  Indication for CS: repeat, 2 Mds please  OB history/complications: GRACIA

## 2025-04-14 NOTE — PROGRESS NOTES
JOHN  The  service was used for the duration of the visit  Doing well, some intermittent swelling in her ankles  Advised decreased sodium, increased water intake, compression stocking, and reviewed signs of pre-eclampsia  FM is good  RH positive  S/P Anatomy scan with MFM at 20 weeks  1 hr glucose and CBC already ordered  RTC q4wks

## 2025-05-15 NOTE — PROGRESS NOTES
JOHN    GA: 28w4d  Vitals:    05/15/25 1040   BP: 104/62   Pulse: 77   Weight: 202 lb (91.6 kg)   Height: 68\"       Doing well, +FM  Denies LOF/VB/uctx  Rh positive, TDAP vaccine received today, Depression Scale Total: 0 (2025  9:56 AM)   3T labs ordered today  Pt to complete 3hr GTT tomorrow     Cymro interpretor used for entire conversation, ID#[id number] (Rolo)    Assessment:     Nancy Oodm is a 44 year old  at 28w4d who presents for routine OB visit. Overall doing well.     Problem List Items Addressed This Visit       Advanced maternal age in multigravida ([facility])    History of 2  sections    Request for sterilization    Low-lying placenta ([facility])    Encounter for supervision of normal pregnancy, antepartum ([facility]) - Primary    Relevant Orders    HIV Ag/Ab Combo    RPR W/Conf           Plan:     Patient Active Problem List    Diagnosis    Encounter for supervision of normal pregnancy, antepartum ([facility])    Low-lying placenta ([facility])     Precautions given, pelvic rest, repeat at 32 wga       Request for sterilization     Sterilization papers given 3/21/2025. Pt to bring back signed      Advanced maternal age in multigravida ([facility])     [X] NIPT low risk  [ ] Asprin  [ X ] MFM/Level 2   - Follow-up Growth ultrasound with BPP at 32 weeks.  - Weekly NST's at 36 weeks.         History of 2  sections     [ ] scheduled repeat C/S -->request sent   - Desires bilateral salpingectomy at time of repeat C/S   [x ] MFM/Level 2          - continue routine prenatal care   - labor and rupture of membrane precautions provided  - Fetal movement instructions given    Return to clinic in 2 weeks for JOHN visit     Sarah Tobin, DO  [facility] - OBGYN    Note to patient and family   The  [employer] Cures Act makes medical notes available to patients in the interest of transparency.  However, please be advised that this is a medical document.  It is intended as [id number] communication.   It is written and medical language may contain abbreviations or verbiage that are technical and unfamiliar.  It may appear blunt or direct.  Medical documents are intended to carry relevant information, facts as evident, and the clinical opinion of the practitioner.

## 2025-05-15 NOTE — PATIENT INSTRUCTIONS
Labor Instructions    How do I know if it’s true labor?  One of the most important aspects of any pregnancy is being able to recognize the onset of labor.  Unfortunately, on occasion it can be difficult or confusing, especially if you have had one or more children.  Each labor can begin in a different way even if you have had four or five children.    If this is your first child, it is very common to have labor for an average greater than 10 hours; however, there have been rare instances for labor to be two hours or less for a first time mother. It is more important for you to know if this is your second or third baby to realize that any labor after the first is usually shorter.  There is no way to tell how long or short the labor will be. Therefore, please call us if you are unsure labor has started.       Usually, during the last six weeks of pregnancy, Lefty-Duran contractions or “false labor pains occur”.  False labor is generally not very painful though it is not always easy to tell.  You may feel contractions, cramps or uterine tightening somewhere between every 3-30 minutes but they will not continue to get stronger over time.  If you lie down, drink plenty of fluid or walk around, the contractions may go away.   False contractions are very common if you have been active on your feet for several hours.   Women frequently worry about being sent home from the hospital without having their baby (i.e. the labor stopped).  Actually, this is an unnecessary worry, for this is an infrequent occurrence.     True labor usually begins in one of two ways.  In most patients it begins with contractions of the uterus, which are irregular (but not always) in the beginning.  They are cramp-like in character and feel similar to menstrual cramps.  After a while, they become more regular, and they seem to last a little longer, and feel a little sharper.  These symptoms are very important-more important- than the timing of the  contractions.  Having regular (usually closer), longer lasting (35-70 seconds), and sharper (more painful) contractions are the common symptoms of actual labor.  The second way in which labor can begin which occurs in approximately 30% of all patients is the rupture of the bag of water.  This is a sudden gush of watery fluid, usually sufficient to run down your leg and onto the floor, or you may wet a large area of the bed if it happens at night.  There may also be tricking that is uncontrolled.  If you are unsure, please call the office.      When should I call?  When contractions are strong and every 3-5 minutes.  If you have a gush of water or you think you might be leaking fluid.  If you are bleeding heavily.  If your baby is not moving around at least every 1-2 hours.  If you are worried about something.  When you think you are ready to go to the hospital.    Who do I call?  Call the office at [phone].  If the office is closed, the answering service will send a message to the physician on call.  The on call physician will be available for emergency phone calls only.          Can I eat in labor?  It is good to eat a light meal at home before going to the hospital.  Eat foods like crackers, popsicles, soup and fruit.  Avoid foods that are difficult to digest like meat, a lot of dairy products and high fat foods.  DO NOT EAT if you know you are scheduled for a  ().      What will happen when I get to the hospital?  When you arrive at the hospital, you will be admitted and examined.   There are a few factors that will determine if you will be allowed to be up out of bed or if you would need to stay in bed.  The main factors are how well the baby is entering into the pelvis and if the bag of degroot is in intact or ruptured.  An intravenous (IV) solution will, with exceptions, be started.  This is extremely important especially for the baby.   Your  will be allowed in the room during your  labor. During the delivery, the nurses will inform you of the hospital policy and how many coaches are allowed.  You may desire pain medication or anesthesia for pain.  You probably discussed some aspects of pain medication with us during your prenatal care.  The various options may also have been discussed in Prenatal Classes.  We utilize IV narcotics and epidural anesthesia when our patients request to have them.  If you chose to have no anesthesia, none will be administered.  A local anesthetic may be used at the time of delivery      What should I to bring to the hospital?  Maternity clothes to go home in  You can bring your own night gown to wear after giving birth, but most women prefer to wear the hospital gown because it may get soiled  Nursing Bra if you are planning to breastfeed  Clothes for your baby to go home in  Baby Car Seat.  Be sure you know how to install it correctly. Please install it before going to the hospital  Routine toiletries like toothbrush, shampoo, hairbrush and etc.   You can bring your favorite pillow, but please put a colored pillow case on it so it doesn’t get mixed up with hospital pillows    How long will I stay in the hospital?  The date you leave the hospital may vary depending on the speed of your recovery.  If you have a vaginal delivery, you will stay in the hospital 24-48 hours after your delivery as long as you aren’t having any complications.    If you have had a , you will stay in the hospital 48-72 hours as long as you aren’t having any complications.       Going Home Instructions  There are no set rules as to what you may do each day or week after you are home.  You will receive discharge instructions to help you each day.  Remember, early ambulation in the hospital is to prevent complications.  Do not let this lull you into a false sense of strength or ability to do certain physical acts which may tire you excessively.  Please call the office within a few days  after you are discharged from the hospital to schedule your post-partum visit, which is usually 4-6 weeks after delivery.    Any medications necessary will be discussed on an individual basis.  If you decide to breastfeed your baby, you should continue your prenatal vitamins.  If you do not breastfeed, simply finish the prenatal vitamins you have.      The staff at [facility] wish you a joyous and exciting birth.  If there is anything we can do to make this a better experience for you please do not hesitate to ask.

## 2025-05-16 NOTE — PROGRESS NOTES
Abnormal 3 hr GTT. Please notify pt with . Have her schedule w/ educator. Referral placed.   Your physician has referred you to the [facility] for Gestational Diabetes Instruction.     To schedule your appointment at either our [facility] or [place] location call [phone].

## 2025-05-29 NOTE — PROGRESS NOTES
Chief Complaint   Patient presents with    Prenatal Care     JOHN     Patient states having pain in hamstrings and has question regarding diabetes.     Translation through LLS    Routine prenatal visit without complaints.  Patient denies any bleeding, leaking fluid, cramping, or regular uterine contractions. Good fetal movement.  Assessment/Plan:  30w4d doing well  GDMA1- wasn't notified regarding results.  Referred to diabetic educator.  AMA/LLP- growth US MFM 25  Prev CS- RCS/BS 25  Kick counts reminded  Reviewed  labor signs and symptoms.  Reviewed vaccine recommendations:  Tdap done.  Diagnoses and all orders for this visit:    Prenatal care, subsequent pregnancy, antepartum (HCC)       Return in about 2 weeks (around 2025) for Routine Prenatal Visit.

## 2025-06-02 NOTE — PATIENT INSTRUCTIONS
Blood Glucose Goals    Start checking your blood sugars 4 times/day:  1.) Fasting (before your first meal of the day)  2.) 2 hours after breakfast  3.) 2 hours after lunch  4.) 2 hours after dinner    Bring your recorded glucose log sheet and food log sheet to your follow up visit in 2 weeks for review.    Ranges:   Fasting: less than 95 mg/dL  2 hours after meal: less than 120 mg/dL    Food Goals    Healthy, well rounded diet with plenty of water.    At least 175 grams of carbohydrates a day.    If 3 full meals is too much for you, aim for small frequent snacks.     Avoid going longer than 5 hours between meals/snacks.    Meal Plan:  Spreading out our carbohydrates throughout the day and adding in small amounts of movement after meals can help us deal with the glucose better.     Breakfast: 30 grams of carbohydrates.  AM Snack: 15 grams of carbohydrates.  Lunch: 45 grams of carbohydrates.  PM Snack: 15 grams of carbohydrates.  Dinner: 45 grams of carbohydrates.  Bedtime Snack: 30 grams of carbohydrates.    Maki King, RD, , LD, CDCES(she/her/hers)  Diabetes Educator  [facility]  [email or web address]  [phone] phone  [phone] Fax

## 2025-06-02 NOTE — PROGRESS NOTES
Nancy Odom   1980 was seen for Gestational Diabetes Counseling: Individual    Date: 2025  Referring Provider: Charles Maria Start time: 10:00am End time: 11:20am    This visit is conducted using Telemedicine with live, interactive audio and video.  Patient verbally consents to Telemedicine visit.  Patient understands and accepts financial responsibility for any deductible, co-insurance and/or co-pays associated with this service.    Language line used to communicate with patient during this encounter.    Language: Israeli  Interpretation Type: telephone               Assessment: LMP 2024   Weight:   Wt Readings from Last 6 Encounters:   25 201 lb   05/15/25 202 lb   25 196 lb   25 188 lb   25 188 lb   25 180 lb 12.8 oz         Ms. Jean Esquivel presents today for initial gestational diabetes education. She nausea in first trimester.     Estimated Date of Delivery: 8/3/25   Gestation: 31w1d  First trimester: 1-13  Second trimester: 13-  Third trimester: 28-40    History:     OB History    Para Term  AB Living   4 2 2 0 1 2   SAB IAB Ectopic Multiple Live Births   1 0 0 0 2        GDM Screen:   Lab Results   Component Value Date    GLUFG 82 2025    GLU1G 155 2025    GLU2G 166 (H) 2025    GLU3G 142 (H) 2025        GLUCOSE 1HR OB   Date Value Ref Range Status   2025 143 (H) See comment mg/dL Final     Comment:     If the plasma glucose level measured after 1 hour is >=130, 135 or 140 mg/dl proceed to \"Glucose Tolerance, 100 gm (0 hr, 1 hr, 2hr, 3hr), Gestational (ADA)\" test on a separate day, as clinically indicated.       2025 122 <135 mg/dL Final        History of GDM:  No  Family history of Type 2 Diabetes: mom gestational diabetes and type II dm    Diet & Exercise:     Obtained usual diet history:      TYPICAL  FOOD  NOTES   Breakfast  Egg, spinach and avocado,     Meka with egg       fruit, pasta,  fries, bread, candy, limiting due to gdm    Lunch  Salad with protein, chicken, fish          Dinner  Yogurt with fruit, cereal, bread and egg         Snacks  Fruit,          Beverages  Water,           Juices and soda- limiting   Eating out  Limited       Used to do once per week        Current physical activity: none  Sleep: having problem- water, bathroom, not sleepy, uncomfortable       Education:     GDM Overview:  Reviewed gestational diabetes as diagnosis including target blood glucose values.  Benefits, risks, and management options for improving/maintaining glucose control to mother/baby discussed.    Healthy Eating:  Discussed nutrition concepts for pregnancy/healthy eating and effects of food on BG value.  Timing of meals; what is a carbohydrate, protein, fat.  Taught: Carb counting, label reading, meal planning.  Suggested minimal carb intake of 175 gm.    Being Active:  Benefits and effects of activity on BG discussed.  Reviewed types of recommended activity, duration, precautions, and when to call MD.    Monitoring:  Instructed on how to use glucose monitor/proper lancet disposal. Checking schedules are:   Fastin-95 mg/dL, Call MD is >105 md/dL twice in 1 week   2 Hour Post Prandial:  Less than 120 md/dL, Call MD if >140 md/dL twice in 1 week.    Pt came in with a a glucose meter:  No  Instructed /demonstrated ability to perform blood glucose checking on: demonstrated      Taking Medication:  Reviewed when medication might be indicated.    Reducing Risk:  Effects of elevated blood glucose on mother/baby reviewed.  Discussed management (hyperglycemia, hypoglycemia, sick day, other) and when to call provider.  Post pregnancy management/prevention of Type 2 DM, and increased risk of having diabetes later in life reviewed.    Healthy Coping:  Family involvement/social support encouraged.  Identification of lifestyle behaviors willing to change discussed.    Training Tools Provided:    handout.  BG Log Sheets    Recommendations:      1. Follow recommended meal plan.   2. Begin checking fasting glucose and 2 hour after meals   3. Bring glucose / food log to next visit with diabetes educator. Bring glucose log sheets to MD office visits.   4. Encouraged activity if no restrictions.   5. Encouraged Emira to contact the diabetes center with any questions or concerns.    Glucose meter kit, test strips and lancets sent to preferred pharmacy.    Patient verbalized understanding and has no further questions at this time.  Emailed/written materials provided for all topics covered.    Scheduled pt to follow-up x 1 with the Diabetes Center 6/18/2025   Future Appointments   Date Time Provider Department Center   6/11/2025 11:00 AM [email or web address]  [facility]   6/12/2025 10:00 AM Charles Maria MD [facility] [place]   6/18/2025  9:00 AM Maki King RD [facility]   6/26/2025 10:00 AM Monserrat Knight MD [facility] [place]   7/9/2025  8:00 AM [facility] OB NURSE [place] [facility] [place]   7/9/2025  8:30 AM Diana Godinez APRN [facility] [place]   7/15/2025 11:00 AM [facility] OB NURSE [place] [facility] [place]   7/15/2025 11:30 AM Luz Jones MD [facility] [place]   7/21/2025  4:00 PM [facility] OB NURSE [place] [facility] [place]   7/21/2025  4:30 PM Lila Bearden MD [facility] [place]       Maki King RD, , LD, CDCES

## 2025-06-11 NOTE — PROGRESS NOTES
Pt here for growth ultrasound/BPP  + FM  Pt states feeling occas vaginal pressure, denies uterine cramping, vag bleeding and leaking fluid.

## 2025-06-11 NOTE — PROGRESS NOTES
Outpatient Maternal-Fetal Medicine Consultation    Dear Dr. Knight    Thank you for requesting ultrasound evaluation and maternal fetal medicine consultation on your patient Nancy Odom.  As you are aware she is a 44 year old female  with a haro pregnancy and an Estimated Date of Delivery: 8/3/25.  A maternal-fetal medicine f/u is today. Her prenatal records and labs were reviewed.    2 prior pregnancies were uncomplicated.  She has had 2 cesareans.  ROS    HISTORY  OB History    Para Term  AB Living   4 2 2 0 1 2   SAB IAB Ectopic Multiple Live Births   1 0 0 0 2      # Outcome Date GA Lbr Gurpreet/2nd Weight Sex Type Anes PTL Lv   4 Current            3 SAB         ND   2 Term 16    F Caesarean   RIKI   1 Term 12    F Caesarean None  RIKI       Allergies:  Allergies[1]   Current Meds:  Current Outpatient Medications   Medication Sig Dispense Refill    prenatal vitamin with DHA 27-0.8-228 MG Oral Cap Take 1 capsule by mouth in the morning.      aspirin 81 MG Oral Tab EC Take 1 tablet (81 mg total) by mouth in the morning.      Blood Glucose Monitoring Suppl (ONETOUCH VERIO FLEX SYSTEM) w/Device Does not apply Kit Use as directed 4x per day. 1 kit 0    OneTouch Delica Lancets 33G Does not apply Misc Check 4 times per day for gestational diabetes 100 each 3    Glucose Blood (ONETOUCH VERIO) In Vitro Strip Check 4 times per day for gestational diabetes 100 strip 3    Dextromethorphan-guaiFENesin (ROBITUSSIN DM OR) Take by mouth. (Patient not taking: Reported on 2025)      METOCLOPRAMIDE 10 MG Oral Tab TAKE 1 TABLET BY MOUTH THREE TIMES A DAY AS NEEDED (Patient not taking: Reported on 2025) 30 tablet 0    ondansetron 8 MG Oral Tablet Dispersible Take 1 tablet (8 mg total) by mouth every 8 (eight) hours as needed for Nausea. (Patient not taking: Reported on 2025) 30 tablet 3    Pyridoxine HCl (VITAMIN B-6) 25 MG Oral Tab Take 1 tablet (25 mg total) by  mouth daily. (Patient not taking: Reported on 2025)          HISTORY:  No past medical history on file.   Past Surgical History:   Procedure Laterality Date    Foot surgery      Hc  section level ii        Family History   Problem Relation Age of Onset    Cancer Father         prostate    Diabetes Mother       Social History     Socioeconomic History    Marital status:    Tobacco Use    Smoking status: Never    Smokeless tobacco: Never   Vaping Use    Vaping status: Never Used   Substance and Sexual Activity    Alcohol use: Not Currently     Comment: social    Drug use: Never          PHYSICAL EXAMINATION:  /72 (BP Location: Right arm, Patient Position: Sitting, Cuff Size: adult)   Pulse 79   Ht 5' 8\" (1.727 m)   Wt 195 lb (88.5 kg)   LMP 2024   BMI 29.65 kg/m²   Physical Exam  Constitutional:       Appearance: Normal appearance.   Abdominal:      Palpations: Abdomen is soft.      Tenderness: There is no abdominal tenderness.   Neurological:      Mental Status: She is alert.   Psychiatric:         Mood and Affect: Mood normal.         Behavior: Behavior normal.         OBSTETRIC ULTRASOUND  The patient had a follow-up growth and BPP ultrasound today which revealed normal interval fetal growth and a BPP of 8/8.   Ultrasound Findings:  Single IUP in cephalic presentation.    Placenta is posterior.   Cardiac activity is present at 150 bpm  EFW 1839 g ( 4 lb 1 oz); 36%.    CALLY is  15.5 cm.  MVP is 5.3 cm  BPP is 8/8.     The previous low lying placenta has resolved.  The placenta tip measures 2.76 cm from the internal os.     The fetal measurements are consistent with established AARON. No gross ultrasound evidence of structural abnormalities are seen today. The patient understands that ultrasound cannot rule out all structural and chromosomal abnormalities.   See PACS/Imaging Tab For Complete Ultrasound Report  I interpreted the results and reviewed them with the  patient.    DISCUSSION  During her visit we discussed and reviewed the following issues:  ADVANCED MATERNAL AGE    Background  I reviewed with the patient that pregnancies in women of advanced maternal age (35 or older at delivery) are associated with elevated risks. Specifically, there is a higher rate of:  Fetal malformations  Preeclampsia  Gestational diabetes  Intrauterine fetal death   Please see previous [facility] detailed discussion.         Prevention of Preeclampsia  Please see previous [facility] detailed discussion.       GLUCOSE 1HR OB   Date Value Ref Range Status   05/13/2025 143 (H) See comment mg/dL Final     Comment:     If the plasma glucose level measured after 1 hour is >=130, 135 or 140 mg/dl proceed to \"Glucose Tolerance, 100 gm (0 hr, 1 hr, 2hr, 3hr), Gestational (ADA)\" test on a separate day, as clinically indicated.       02/01/2025 122 <135 mg/dL Final      Lab Results   Component Value Date    GLUFG 82 05/16/2025    GLU1G 155 05/16/2025    GLU2G 166 (H) 05/16/2025    GLU3G 142 (H) 05/16/2025             She was informed of the potential implications and risks associated with gestational diabetes (GDM) to her and her unborn child, especially when poorly controlled. We discussed about the increased incidence of macrosomia and related birth injury to her and her baby. We talked about the increased and associated risk of fetal hyperinsulinemia, jaundice, electrolyte imbalance, seizure activity, IUFD and adverse outcome. We talked about her increased risk of having diabetes later in life. The importance of good glycemic control and avoidance of prolonged hypo- and hyperglycemia was addressed.        She was instructed how to assess her blood sugar and started on a diabetic diet today. She was instructed to call us in one week or sooner about her blood sugar levels. Insulin therapy may be started depending on her blood sugar levels. I would suggest an ultrasound in the third trimester to assess growth and  weekly NST by 36 weeks.      If she required insulin I would suggest growth ultrasound monthly in the third trimester; NST weekly by about 32 weeks and increase to twice weekly by 36 weeks.        Her diet was modified to provide three meals and three snacks with fewer carbohydrates.  She received instruction on self-monitoring of blood glucose.  She will be testing her blood sugars at fasting and 2 hour postprandially.   Fasting blood glucose should be less than 95 and two hour postprandial <120.         IMPRESSION:  IUP at 32w3d   AMA --NIPT low risk, declines invasive testing   Prior  x 2  Poor pregnancy history  Low lying placenta -RESOLVED  Gestational diabetes A1    RECOMMENDATIONS:  Continue care with Dr. Knight  Low-dose aspirin 81 mg daily  Continue four times daily capillary blood glucose assessments (fasting and 2 hour postprandial)  Weekly Maternal-Fetal Medicine review of capillary blood glucose values  Follow-up growth ultrasound every 4 weeks in the third trimester  Weekly NSTs    If medications are required for glucose control:   Weekly NSTs at 32 weeks; twice weekly NST's at 34 weeks  Delivery at 39-40 weeks        Thank you for allowing me to participate in the care of your patient.  Please do not hesitate to contact me if additional questions or concerns arise.      Yoly Lawson M.D.    40 minutes spent in review of records, patient consultation, documentation and coordination of care.  The relevant clinical matter(s) are summarized above.     Note to patient and family  The 21st Century Cures Act makes medical notes available to patients in the interest of transparency.  However, please be advised that this is a medical document.  It is intended as hvdu-mp-nkhy communication.  It is written and medical language may contain abbreviations or verbiage that are technical and unfamiliar.  It may appear blunt or direct.  Medical documents are intended to carry relevant information, facts as  evident, and the clinical opinion of the practitioner.         [1] No Known Allergies

## 2025-06-12 NOTE — PROGRESS NOTES
Return OB Visit 28-33 WGA      GA: 32w4d  Vitals:    25 1016   BP: 102/60   Pulse: 74   Weight: 198 lb (89.8 kg)   Height: 68\"       Doing well, +FM  Denies LOF/VB/uctx  Rh positive, TDAP received 5/15/25, EPDS Depression Scale Total: 0 (2025  9:56 AM)   PTL and Fetal movement instructions given  3rd T HIV/RPR NR  Had f/u appointment with diabetic educator. Doing well with diet.      Patient Active Problem List    Diagnosis    Diet controlled gestational diabetes mellitus (GDM) in third trimester (HCC)    Low-lying placenta (HCC)-RESOLVED     Precautions given, pelvic rest, repeat at 32 wga       Request for sterilization     Sterilization papers signed and scanned 3/21/25      Advanced maternal age in multigravida (HCC)     [X] NIPT low risk  [ ] Asprin  [ X ] MFM/Level 2   [X] Follow-up Growth ultrasound with BPP at 32 weeks. - EFW 1839 g ( 4 lb 1 oz); 36%.    Low-dose aspirin 81 mg daily  Continue four times daily capillary blood glucose assessments (fasting and 2 hour postprandial)  Weekly Maternal-Fetal Medicine review of capillary blood glucose values  Follow-up growth ultrasound every 4 weeks in the third trimester  Weekly NSTs                  If medications are required for glucose control:                 Weekly NSTs at 32 weeks; twice weekly NST's at 34 weeks  Delivery at 39-40 weeks        History of 2  sections     [ X] scheduled repeat C/S   - Desires bilateral salpingectomy at time of repeat C/S   [x ] MFM/Level 2            RTC in 2 wks      Note to patient and family   The 21st Century Cures Act makes medical notes available to patients in the interest of transparency.  However, please be advised that this is a medical document.  It is intended as eaec-jq-yuhg communication.  It is written and medical language may contain abbreviations or verbiage that are technical and unfamiliar.  It may appear blunt or direct.  Medical documents are intended to carry relevant  information, facts as evident, and the clinical opinion of the practitioner.      Charles Maria MD

## 2025-06-18 NOTE — PROGRESS NOTES
Nancy Odom  : 1980 was seen for GDM  Individual Follow-Up Counseling    Date: 2025  Referring Provider: Theodore Maria MD    Start time: 9:00am End time: 9:30am    Language line used to communicate with patient during this encounter.    Language: Malay   ID: Clint  Interpretation Type: video         Assessment: LMP 2024   Weight:   Wt Readings from Last 6 Encounters:   25 198 lb   25 195 lb   25 201 lb   05/15/25 202 lb   25 196 lb   25 188 lb       Ms. Jean Esquivel presents today for follow up gestational diabetes education. She has overall been doing well. Noticed that bread raises blood sugars and that when she get frustrated and yells at kids that raises blood sugars. Discussed how stress can affect blood sugars. Encouraged trying one piece of bread instead of two. Low on strips and unable to pick them up early.     Estimated Date of Delivery: 8/3/25   Gestation:33w3d  First trimester: 1-  Second trimester: -  Third trimester: 28-40    History:     OB History    Para Term  AB Living   4 2 2 0 1 2   SAB IAB Ectopic Multiple Live Births   1 0 0 0 2        GDM Screen:   Lab Results   Component Value Date    GLUFG 82 2025    GLU1G 155 2025    GLU2G 166 (H) 2025    GLU3G 142 (H) 2025        GLUCOSE 1HR OB   Date Value Ref Range Status   2025 143 (H) See comment mg/dL Final     Comment:     If the plasma glucose level measured after 1 hour is >=130, 135 or 140 mg/dl proceed to \"Glucose Tolerance, 100 gm (0 hr, 1 hr, 2hr, 3hr), Gestational (ADA)\" test on a separate day, as clinically indicated.       2025 122 <135 mg/dL Final            Blood Glucose Levels:       She brought in BG log. Reviewed in detail with patient. Copies made and sent to scan. Provided copy to patient.     Diet & Exercise:     Obtained diet history from last 2 weeks:     TYPICAL  FOOD  NOTES   Breakfast   Egg, spinach, tomatoe slice of avocado bread         Lunch  Grilled chicken salad with cream         Dinner  Yogurt with cereal, almonds         Snacks  nuts           Following meal plan: yes  Skips: none  Meals are balanced yes.  Carb Intake is good.  Food Selections are good2.  Drinking plenty of water yes.    Current physical activity: walking    Current sleep patterns:  good    Education:     GDM Overview:  Reviewed target blood glucose values for GDM.  Discussed benefits/risks to mother/baby management options to improve/maintain glucose control.     Healthy Eating:  Reviewed/Reinforced:  Nutrition concepts for pregnancy/healthy eating and effect of food on blood glucose.  Meal planning process and benefits of pre-planning meals/snacks.  Appropriate timing of meals/snacks.  Carb counting.  4 servings of calcium while pregnant/breastfeeding.  Additional concepts:     Being Active:  Reviewed benefits of effects of activity on BG values.  Reviewed types of activity, duration, precautions.    Monitoring:  Instructed to report readings to MD as directed.  Call MD: if fasting blood glucose is > 95 twice in 1 week. If 2 hr postprandial is > 120 twice in 1 week at any one meal.    Taking Medication:  Reviewed appropriate timing (if on insulin) of meds.   Reviewed probability of needing medication adjustments throughout pregnancy.     Reducing Risk:  Discussed management of (hyperglycemia, hypoglycemia) and when to call provider.    Recommendations:      1. Follow recommended meal plan.   2. Test blood glucose and ketones as directed.    3. Bring glucose log to each MD visit.   4. Start/continue activity if no restrictions.    5. Additional recommendations:     Patient verbalized understanding and has no further questions at this time.    Maki King, RD, , LD, CDCES

## 2025-06-18 NOTE — PATIENT INSTRUCTIONS
Objetivos de glucosa en david    Continúe controlando stovall nivel de glucosa en david 4 veces al día:  1.) En ayunas (antes de stovall primera comida del día)  2.) 2 horas después del desayuno  3.) 2 horas después del almuerzo  4.) 2 horas después de la farhana    Rangos:  En ayunas: menos de 95 mg/dL  2 horas después de comer: menos de 120 mg/dL    Informe a stovall ginecólogo si:  - stovall nivel en ayunas es superior a 95 mg/dL 2 veces por semana  - stovall nivel 2 horas después de comer es superior a 120 mg/dL 2 veces por semana    Objetivos alimentarios    Dieta saludable y equilibrada con abundante agua.    Al menos 175 gramos de carbohidratos al día. 70 g de proteína al día  28 g de fibra al día  96 onzas líquidas  3.er trimestre + 450 kcal  Si 3 comidas completas son demasiado para ti, intenta consumir pequeños refrigerios frecuentes.    Tu dieta prenatal debe contener: 400 microgramos de ácido fólico/folato, B12, juan, vitamina D, Omega 3 DHA, mila    Consume 4 raciones de calcio al día. Bala contribuirá al desarrollo de tu bebé. Los alimentos con calcio incluyen:  - Lácteos (leche, yogur, queso)  - Naranjas y jugo de naranja con calcio añadido  - Col rizada, berza, brócoli  - Higos secos, papayas y plátanos  - Semillas: chía, sésamo  - Frijoles y lentejas  - Almendras  - Ruibarbo  - Alimentos y bebidas fortificados (cereales, leches vegetales)  - Tofu  - Edamame    Maki King, RD, , LD, CDCES (odilia/odilia/odilia)  Educadora en Diabetes  [facility] [email or web address]  Teléfono: [phone]  Fax: [phone]

## 2025-06-26 NOTE — PROGRESS NOTES
The following individual(s) verbally consented to be recorded using ambient AI listening technology and understand that they can each withdraw their consent to this listening technology at any point by asking the clinician to turn off or pause the recording:    Name: Nancy Odom    JOHN  44 year old yo, , at GA 34w4d    History of Present Illness  Nancy Odom is a 44 year old female who presents with concerns of vaginal discharge during pregnancy.    She experienced a gelatinous, transparent vaginal discharge without odor yesterday, which has since resolved. There is no current discharge today. No history of sexually transmitted infections, such as gonorrhea or chlamydia.    She is currently pregnant and reports feeling the baby move well. No contractions, cramping, bleeding, or spotting are present.    She is monitoring her blood sugar levels, which have been relatively well-controlled, with only a few abnormal values. She checks her blood sugar two hours after eating and notes that certain foods, like bread, can cause her levels to rise. She is not currently on medication for blood sugar control.    Vitals:    25 1012   BP: 104/62   Pulse: 87   Weight: 197 lb 8 oz (89.6 kg)   Height: 68\"       Doing well, +FM  Denies LOF/VB/uctx    GDMA1 - blood sugars at goal.     Noticed vaginal discharge yesterday. Scant amount of yellow-white discharge in vault. Cervix  visually closed. Vaginosis swab obtained.     Rh +, TDAP received, EPDS Depression Scale Total: 0 (2025  9:56 AM)       Assessment:     Nancy Odom is a 44 year old  at 34w4d who presents for routine OB visit. Overall doing well.     Problem List Items Addressed This Visit    None  Visit Diagnoses         Third trimester pregnancy (HCC)    -  Primary      Vaginitis and vulvovaginitis        Relevant Orders    Vaginitis Vaginosis PCR Panel      Screening examination for STD  (sexually transmitted disease)        Relevant Orders    Chlamydia/Gc Amplification                Plan:     Assessment & Plan  Routine Prenatal Care  Currently 34 weeks pregnant. Fetal growth and movement are normal. No contractions, cramping, bleeding, or spotting reported. Fetal heart rate is within normal range.  - Measure fundal height  - Listen to fetal heartbeat  - Schedule next appointment at 36 weeks for non-stress test    Vaginal Discharge  Reported gelatinous, transparent, and odorless vaginal discharge resolved by the day of the visit. Differential diagnosis includes normal pregnancy-related discharge, bacterial vaginosis, or yeast infection. Examination showed no signs of infection, but a swab test will confirm.  - Perform swab test to check for bacterial vaginosis or yeast infection    Gestational Diabetes Mellitus  Blood sugar levels are generally well-controlled with occasional elevated values. She is monitoring blood sugar levels two hours postprandial and fasting. No current need for medication as blood sugar levels are mostly within target range. She is experimenting with diet to understand its impact on blood sugar levels.  - Reach out to diabetic educator regarding jagjit for blood sugar tracking  - Advise on dietary adjustments to maintain blood sugar control      Patient Active Problem List    Diagnosis    Diet controlled gestational diabetes mellitus (GDM) in third trimester (HCC)    Low-lying placenta (HCC)-RESOLVED     Precautions given, pelvic rest, repeat at 32 wga       Request for sterilization     Sterilization papers signed and scanned 3/21/25      Advanced maternal age in multigravida (HCC)     [X] NIPT low risk  [ ] Asprin  [ X ] MFM/Level 2   [X] Follow-up Growth ultrasound with BPP at 32 weeks. - EFW 1839 g ( 4 lb 1 oz); 36%.    Low-dose aspirin 81 mg daily  Continue four times daily capillary blood glucose assessments (fasting and 2 hour postprandial)  Weekly Maternal-Fetal Medicine  review of capillary blood glucose values  Follow-up growth ultrasound every 4 weeks in the third trimester  Weekly NSTs                  If medications are required for glucose control:                 Weekly NSTs at 32 weeks; twice weekly NST's at 34 weeks  Delivery at 39-40 weeks        History of 2  sections     [ X] scheduled repeat C/S   - Desires bilateral salpingectomy at time of repeat C/S   [x ] MFM/Level 2          - continue routine prenatal care   - labor and rupture of membrane precautions provided  - Fetal movement instructions given    Return to clinic in 2 weeks for JOHN visit       Monserrat Knight MD   [facility] - OBGYN      Note to patient and family   The 21st Century Cures Act makes medical notes available to patients in the interest of transparency.  However, please be advised that this is a medical document.  It is intended as jxsv-iv-kxac communication.  It is written and medical language may contain abbreviations or verbiage that are technical and unfamiliar.  It may appear blunt or direct.  Medical documents are intended to carry relevant information, facts as evident, and the clinical opinion of the practitioner.

## 2025-06-26 NOTE — PATIENT INSTRUCTIONS
Labor Instructions    How do I know if it’s true labor?  One of the most important aspects of any pregnancy is being able to recognize the onset of labor.  Unfortunately, on occasion it can be difficult or confusing, especially if you have had one or more children.  Each labor can begin in a different way even if you have had four or five children.    If this is your first child, it is very common to have labor for an average greater than 10 hours; however, there have been rare instances for labor to be two hours or less for a first time mother. It is more important for you to know if this is your second or third baby to realize that any labor after the first is usually shorter.  There is no way to tell how long or short the labor will be. Therefore, please call us if you are unsure labor has started.       Usually, during the last six weeks of pregnancy, Lefty-Duran contractions or “false labor pains occur”.  False labor is generally not very painful though it is not always easy to tell.  You may feel contractions, cramps or uterine tightening somewhere between every 3-30 minutes but they will not continue to get stronger over time.  If you lie down, drink plenty of fluid or walk around, the contractions may go away.   False contractions are very common if you have been active on your feet for several hours.   Women frequently worry about being sent home from the hospital without having their baby (i.e. the labor stopped).  Actually, this is an unnecessary worry, for this is an infrequent occurrence.     True labor usually begins in one of two ways.  In most patients it begins with contractions of the uterus, which are irregular (but not always) in the beginning.  They are cramp-like in character and feel similar to menstrual cramps.  After a while, they become more regular, and they seem to last a little longer, and feel a little sharper.  These symptoms are very important-more important- than the timing of the  contractions.  Having regular (usually closer), longer lasting (35-70 seconds), and sharper (more painful) contractions are the common symptoms of actual labor.  The second way in which labor can begin which occurs in approximately 30% of all patients is the rupture of the bag of water.  This is a sudden gush of watery fluid, usually sufficient to run down your leg and onto the floor, or you may wet a large area of the bed if it happens at night.  There may also be tricking that is uncontrolled.  If you are unsure, please call the office.      When should I call?  When contractions are strong and every 3-5 minutes.  If you have a gush of water or you think you might be leaking fluid.  If you are bleeding heavily.  If your baby is not moving around at least every 1-2 hours.  If you are worried about something.  When you think you are ready to go to the hospital.    Who do I call?  Call the office at [phone].  If the office is closed, the answering service will send a message to the physician on call.  The on call physician will be available for emergency phone calls only.          Can I eat in labor?  It is good to eat a light meal at home before going to the hospital.  Eat foods like crackers, popsicles, soup and fruit.  Avoid foods that are difficult to digest like meat, a lot of dairy products and high fat foods.  DO NOT EAT if you know you are scheduled for a  ().      What will happen when I get to the hospital?  When you arrive at the hospital, you will be admitted and examined.   There are a few factors that will determine if you will be allowed to be up out of bed or if you would need to stay in bed.  The main factors are how well the baby is entering into the pelvis and if the bag of degroot is in intact or ruptured.  An intravenous (IV) solution will, with exceptions, be started.  This is extremely important especially for the baby.   Your  will be allowed in the room during your  labor. During the delivery, the nurses will inform you of the hospital policy and how many coaches are allowed.  You may desire pain medication or anesthesia for pain.  You probably discussed some aspects of pain medication with us during your prenatal care.  The various options may also have been discussed in Prenatal Classes.  We utilize IV narcotics and epidural anesthesia when our patients request to have them.  If you chose to have no anesthesia, none will be administered.  A local anesthetic may be used at the time of delivery      What should I to bring to the hospital?  Maternity clothes to go home in  You can bring your own night gown to wear after giving birth, but most women prefer to wear the hospital gown because it may get soiled  Nursing Bra if you are planning to breastfeed  Clothes for your baby to go home in  Baby Car Seat.  Be sure you know how to install it correctly. Please install it before going to the hospital  Routine toiletries like toothbrush, shampoo, hairbrush and etc.   You can bring your favorite pillow, but please put a colored pillow case on it so it doesn’t get mixed up with hospital pillows    How long will I stay in the hospital?  The date you leave the hospital may vary depending on the speed of your recovery.  If you have a vaginal delivery, you will stay in the hospital 24-48 hours after your delivery as long as you aren’t having any complications.    If you have had a , you will stay in the hospital 48-72 hours as long as you aren’t having any complications.       Going Home Instructions  There are no set rules as to what you may do each day or week after you are home.  You will receive discharge instructions to help you each day.  Remember, early ambulation in the hospital is to prevent complications.  Do not let this lull you into a false sense of strength or ability to do certain physical acts which may tire you excessively.  Please call the office within a few days  after you are discharged from the hospital to schedule your post-partum visit, which is usually 4-6 weeks after delivery.    Any medications necessary will be discussed on an individual basis.  If you decide to breastfeed your baby, you should continue your prenatal vitamins.  If you do not breastfeed, simply finish the prenatal vitamins you have.      The staff at [facility] wish you a joyous and exciting birth.  If there is anything we can do to make this a better experience for you please do not hesitate to ask.

## 2025-07-08 NOTE — PROGRESS NOTES
Outpatient Maternal-Fetal Medicine Follow-Up     Dear Dr. Knight,     Thank you for requesting ultrasound evaluation and maternal fetal medicine consultation on your patient Nancy Odom.  As you are aware she is a 44/43 year old female  with a Sanchez pregnancy and an Estimated Date of Delivery: 8/3/25.  She returned to maternal-fetal medicine today for a follow-up visit.  Her history was reviewed from her prior visit and there were no interval changes.     Antepartum Risk Factors  AMA  H/o  x 2; repeat  is planned for   Gestational diabetes, A1    S: She reports good fetal movement.  She reports that she is following the diet and testing her blood sugar 4 times daily.  With the help of the language line  we reviewed her blood sugar records.  Her fasting blood sugars are all less than 95 and her postmeal readings are generally under 115.    PHYSICAL EXAMINATION:  /75 (BP Location: Right arm, Patient Position: Sitting, Cuff Size: adult)   Pulse 67   Ht 5' 8\" (1.727 m)   Wt 199 lb (90.3 kg)   LMP 2024   BMI 30.26 kg/m²   General: alert and oriented, no acute distress  Abdomen: gravid, soft, non-tender  Extremities: non-tender, no edema     OBSTETRIC ULTRASOUND  The patient had a follow up fetal ultrasound today which I interpreted the results and reviewed them with the patient.    Ultrasound Findings:    Single IUP in cephalic presentation.    Placenta is posterior.   A 3 vessel cord is noted. Normal cord insertion.  Cardiac activity is present at 132 bpm  EFW 2644 g ( 5 lb 13 oz); 25%.    CALLY is  13.1 cm.  MVP is 5 cm  BPP is 8/8.     The fetal measurements are consistent with established AARON. No gross ultrasound evidence of structural abnormalities are seen today. The patient understands that ultrasound cannot rule out all structural and chromosomal abnormalities.     See imaging tab for the complete US report.     DISCUSSION  During  her visit we discussed and reviewed the following issues:  ADVANCED MATERNAL AGE    Background  I reviewed with the patient that pregnancies in women of advanced maternal age (35 or older at delivery) are associated with elevated risks. Specifically, there is a higher rate of:  Fetal malformations  Preeclampsia  Gestational diabetes  Intrauterine fetal death    As a result, enhanced pregnancy surveillance is advised for these patients including a comprehensive ultrasound to assess for fetal malformations (at 20 weeks) and a third trimester ultrasound assessment for fetal growth (at 32 weeks). In addition, weekly NST's (initiating at 36 weeks gestation for women 35-39 years and at 32 weeks gestation for women 40 years and older) are also advised. Routine obstetric care is more than adequate to assess for gestational diabetes and preeclampsia; hence, no further significant alterations in obstetric care are advised.   See [facility] note from 3/19/2025 for detailed review    GESTATIONAL DIABETES -follow-up.  See [facility] note from 6/11/2025 for detailed review    3 hour GTT  Lab Results   Component Value Date    GLUFG 82 05/16/2025    GLU1G 155 05/16/2025    GLU2G 166 (H) 05/16/2025    GLU3G 142 (H) 05/16/2025       We reviewed the potential implications and risks associated with GDM to her and her fetus, especially when poorly controlled. We discussed the increased incidence of macrosomia and the potential for related birth injury to her and her baby. We talked about the increase risks associated risk of fetal hyperinsulinemia, jaundice, electrolyte imbalance, seizure activity, IUFD and other adverse obstetric outcomes. We also reviewed her  increased risk of having diabetes later in life. The importance of good glycemic control and avoidance of prolonged hypoglycemia and hyperglycemia was addressed.    Her capillary blood glucose records were discussed today; her compliance with the recommended four times daily assessments  (fasting and two-hour post-prandial) is excellent.   Her overall glucose control is good.    The patient is presently on diet therapy; her compliance with her diabetic diet regimen was reviewed and it is good.       Medical Regimen Recommendation:   Continue ADA diet     We reviewed the signs and symptoms of preeclampsia,  labor and monitoring fetal movement.  We discussed reasons for her to call her physician.    Discussion of the ultrasound consultation was performed with the assistance of language line  [place] ID number [id number].  I showed the patient how to use the [employer] glucose log.        IMPRESSION:  IUP at 36w4d   Normal fetal growth and  testing  AMA --NIPT low risk  Prior  x 2  Poor pregnancy history  Gestational diabetes A1     RECOMMENDATIONS:  Continue care with Dr. Knight  Low-dose aspirin 81 mg daily  Continue four times daily capillary blood glucose assessments (fasting and 2 hour postprandial)  Weekly OB review of capillary blood glucose values  Weekly NST and increase to twice weekly  testing at 38 weeks.  This should include weekly NST and weekly NST with CALLY or BPP  Delivery at 39-40 weeks      Total time spent 30 minutes this calendar day which includes preparing to see the patient including chart review, obtaining and/or reviewing additional medical history, performing a physical exam and evaluation, documenting clinical information in the electronic medical record, independently interpreting results, counseling the patient, communicating results to the patient/family/caregiver and coordinating care.     Case discussed with patient who demonstrated understanding and agreement with plan.     Thank you for allowing me to participate in the care of this patient.  Please feel free to contact me with any questions.    Jaz Oscar MD  Maternal-Fetal Medicine       Note to patient and family:  The 21st Century Cures Act makes medical notes available to  patients in the interest of transparency.  However, please be advised that this is a medical document.  It is intended as a peer to peer communication.  It is written in medical language and may contain abbreviations or verbiage that are technical and unfamiliar.  It may appear blunt or direct.  Medical documents are intended to carry relevant information, facts as evident, and the clinical opinion of the practitioner.

## 2025-07-09 NOTE — PROGRESS NOTES
JOHN 36w3d    Doing well, +FM  Denies contractions  Denies LOF, VB  LLS used for today's visit    FHT-NST reactive  PNL:  GBS today  Mode of delivery: RCS scheduled 7/28   Immunizations: s/p TDAP  AMA: monthly growth US, weekly NST  GDM: Pt notes she will send sugars via MyChart  Labor precautions reviewed  Fetal movement discussed    Return in 1 weeks

## 2025-07-14 NOTE — PROGRESS NOTES
Preadmission call done with help of  Neda #[id number]. Pt verb understanding and denies further questions

## 2025-07-15 NOTE — PROGRESS NOTES
[facility]  Obstetrics and Gynecology  Routine OB Visit Progress Note    Subjective:     Nancy Odom is a 45 year old  at 37w2d who presents for routine OB visit.  Patient reports doing well. Denies contractions, vaginal bleeding or leakage of fluid. Good fetal movement.  Blood sugars reviewed and have been good.     SVE deferred   - GBS   Lab Results   Component Value Date    GBS Negative 2025        Objective:   /64   Pulse 82   Ht 68\"   Wt 199 lb (90.3 kg)   LMP 2024   BMI 30.26 kg/m²   ABD: gravid, nontender  FHT: 145       Cephalic by Leopold's    Assessment:     Nancy Odom is a 45 year old  at 37w2d who presents for routine OB visit. Overall doing well.     Problem List Items Addressed This Visit       Diet controlled gestational diabetes mellitus (GDM) in third trimester ([facility])     Other Visit Diagnoses         Prenatal care, subsequent pregnancy, antepartum ([facility])    -  Primary    Relevant Orders    FETAL NON-STRESS TEST [facility] ONLY 67198 (Completed)            Plan:     Patient Active Problem List    Diagnosis    Diet controlled gestational diabetes mellitus (GDM) in third trimester ([facility])    Low-lying placenta ([facility])-RESOLVED     Precautions given, pelvic rest, repeat at 32 wga       Request for sterilization     Sterilization papers signed and scanned 3/21/25      Advanced maternal age in multigravida ([facility])     [X] NIPT low risk  [ ] Asprin  [ X ] MFM/Level 2   [X] Follow-up Growth ultrasound with BPP at 32 weeks. - EFW 1839 g ( 4 lb 1 oz); 36%.    Low-dose aspirin 81 mg daily  Continue four times daily capillary blood glucose assessments (fasting and 2 hour postprandial)  Weekly Maternal-Fetal Medicine review of capillary blood glucose values  Follow-up growth ultrasound every 4 weeks in the third trimester  Weekly NSTs                  If medications are required for glucose control:                 Weekly NSTs at 32  weeks; twice weekly NST's at 34 weeks  Delivery at 39-40 weeks        History of 2  sections     [ X] scheduled repeat C/S   - Desires bilateral salpingectomy at time of repeat C/S   [x ] MFM/Level 2          - continue routine prenatal care   - labor and rupture of membrane precautions provided  - Fetal movement count given  - Labor precautions provided   Future Appointments   Date Time Provider Department Center   7/15/2025 11:30 AM Luz Jones MD [facility] [place]   2025  4:00 PM [facility] OB NURSE [facility] [place]   2025  4:30 PM Lila Bearden MD [facility] [place]         Luz Jones MD   [facility] - OBGYN    Note to patient and family   The  Century Cures Act makes medical notes available to patients in the interest of transparency.  However, please be advised that this is a medical document.  It is intended as sjow-uh-dohl communication.  It is written and medical language may contain abbreviations or verbiage that are technical and unfamiliar.  It may appear blunt or direct.  Medical documents are intended to carry relevant information, facts as evident, and the clinical opinion of the practitioner.

## 2025-07-21 NOTE — PATIENT INSTRUCTIONS
Labor Instructions    How do I know if it’s true labor?  One of the most important aspects of any pregnancy is being able to recognize the onset of labor.  Unfortunately, on occasion it can be difficult or confusing, especially if you have had one or more children.  Each labor can begin in a different way even if you have had four or five children.    If this is your first child, it is very common to have labor for an average greater than 10 hours; however, there have been rare instances for labor to be two hours or less for a first time mother. It is more important for you to know if this is your second or third baby to realize that any labor after the first is usually shorter.  There is no way to tell how long or short the labor will be. Therefore, please call us if you are unsure labor has started.       Usually, during the last six weeks of pregnancy, Lefty-Duran contractions or “false labor pains occur”.  False labor is generally not very painful though it is not always easy to tell.  You may feel contractions, cramps or uterine tightening somewhere between every 3-30 minutes but they will not continue to get stronger over time.  If you lie down, drink plenty of fluid or walk around, the contractions may go away.   False contractions are very common if you have been active on your feet for several hours.   Women frequently worry about being sent home from the hospital without having their baby (i.e. the labor stopped).  Actually, this is an unnecessary worry, for this is an infrequent occurrence.     True labor usually begins in one of two ways.  In most patients it begins with contractions of the uterus, which are irregular (but not always) in the beginning.  They are cramp-like in character and feel similar to menstrual cramps.  After a while, they become more regular, and they seem to last a little longer, and feel a little sharper.  These symptoms are very important-more important- than the timing of the  contractions.  Having regular (usually closer), longer lasting (35-70 seconds), and sharper (more painful) contractions are the common symptoms of actual labor.  The second way in which labor can begin which occurs in approximately 30% of all patients is the rupture of the bag of water.  This is a sudden gush of watery fluid, usually sufficient to run down your leg and onto the floor, or you may wet a large area of the bed if it happens at night.  There may also be tricking that is uncontrolled.  If you are unsure, please call the office.      When should I call?  When contractions are strong and every 3-5 minutes.  If you have a gush of water or you think you might be leaking fluid.  If you are bleeding heavily.  If your baby is not moving around at least every 1-2 hours.  If you are worried about something.  When you think you are ready to go to the hospital.    Who do I call?  Call the office at [phone].  If the office is closed, the answering service will send a message to the physician on call.  The on call physician will be available for emergency phone calls only.          Can I eat in labor?  It is good to eat a light meal at home before going to the hospital.  Eat foods like crackers, popsicles, soup and fruit.  Avoid foods that are difficult to digest like meat, a lot of dairy products and high fat foods.  DO NOT EAT if you know you are scheduled for a  ().      What will happen when I get to the hospital?  When you arrive at the hospital, you will be admitted and examined.   There are a few factors that will determine if you will be allowed to be up out of bed or if you would need to stay in bed.  The main factors are how well the baby is entering into the pelvis and if the bag of degroot is in intact or ruptured.  An intravenous (IV) solution will, with exceptions, be started.  This is extremely important especially for the baby.   Your  will be allowed in the room during your  labor. During the delivery, the nurses will inform you of the hospital policy and how many coaches are allowed.  You may desire pain medication or anesthesia for pain.  You probably discussed some aspects of pain medication with us during your prenatal care.  The various options may also have been discussed in Prenatal Classes.  We utilize IV narcotics and epidural anesthesia when our patients request to have them.  If you chose to have no anesthesia, none will be administered.  A local anesthetic may be used at the time of delivery      What should I to bring to the hospital?  Maternity clothes to go home in  You can bring your own night gown to wear after giving birth, but most women prefer to wear the hospital gown because it may get soiled  Nursing Bra if you are planning to breastfeed  Clothes for your baby to go home in  Baby Car Seat.  Be sure you know how to install it correctly. Please install it before going to the hospital  Routine toiletries like toothbrush, shampoo, hairbrush and etc.   You can bring your favorite pillow, but please put a colored pillow case on it so it doesn’t get mixed up with hospital pillows    How long will I stay in the hospital?  The date you leave the hospital may vary depending on the speed of your recovery.  If you have a vaginal delivery, you will stay in the hospital 24-48 hours after your delivery as long as you aren’t having any complications.    If you have had a , you will stay in the hospital 48-72 hours as long as you aren’t having any complications.       Going Home Instructions  There are no set rules as to what you may do each day or week after you are home.  You will receive discharge instructions to help you each day.  Remember, early ambulation in the hospital is to prevent complications.  Do not let this lull you into a false sense of strength or ability to do certain physical acts which may tire you excessively.  Please call the office within a few days  after you are discharged from the hospital to schedule your post-partum visit, which is usually 4-6 weeks after delivery.    Any medications necessary will be discussed on an individual basis.  If you decide to breastfeed your baby, you should continue your prenatal vitamins.  If you do not breastfeed, simply finish the prenatal vitamins you have.      The staff at [facility] wish you a joyous and exciting birth.  If there is anything we can do to make this a better experience for you please do not hesitate to ask.

## 2025-07-21 NOTE — PROGRESS NOTES
JOHN    GA: 38w1d  Vitals:    25 1613   BP: 120/60   Pulse: 86   Weight: 200 lb (90.7 kg)   Height: 68\"       Doing well, +FM  Denies LOF/VB/uctx  SVE deferred      Assessment:     Nancy Odom is a 45 year old  at 38w1d who presents for routine OB visit. Overall doing well.     Problem List Items Addressed This Visit          Endocrine and Metabolic    Diet controlled gestational diabetes mellitus (GDM) in third trimester ([facility]) - Primary       Genitourinary and Reproductive    Request for sterilization       Gravid and     Advanced maternal age in multigravida ([facility])    Relevant Orders    Fetal Non Stress Test [43481] (Completed)    Low-lying placenta ([facility])-RESOLVED           Plan:     Patient Active Problem List    Diagnosis    Diet controlled gestational diabetes mellitus (GDM) in third trimester ([facility])    Low-lying placenta ([facility])-RESOLVED     Precautions given, pelvic rest, repeat at 32 wga       Request for sterilization     Sterilization papers signed and scanned 3/21/25      Advanced maternal age in multigravida ([facility])     [X] NIPT low risk  [ ] Asprin  [ X ] MFM/Level 2   [X] Follow-up Growth ultrasound with BPP at 32 weeks. - EFW 1839 g ( 4 lb 1 oz); 36%.    Low-dose aspirin 81 mg daily  Continue four times daily capillary blood glucose assessments (fasting and 2 hour postprandial)  Weekly Maternal-Fetal Medicine review of capillary blood glucose values  Follow-up growth ultrasound every 4 weeks in the third trimester  Weekly NSTs                  If medications are required for glucose control:                 Weekly NSTs at 32 weeks; twice weekly NST's at 34 weeks  Delivery at 39-40 weeks        History of 2  sections     [ X] scheduled repeat C/S   - Desires bilateral salpingectomy at time of repeat C/S   [x ] MFM/Level 2          - continue routine prenatal care   - labor and rupture of membrane precautions provided  GBS   Lab Results   Component  Value Date    GBS Negative 07/09/2025      Fetal movement count given  Labor precautions provided     NST reactive and reassuring    RCS scheduled 07/28/25. MFM appt 07/23/25          Note to patient and family   The 21st Century Cures Act makes medical notes available to patients in the interest of transparency.  However, please be advised that this is a medical document.  It is intended as etsy-ai-okfg communication.  It is written and medical language may contain abbreviations or verbiage that are technical and unfamiliar.  It may appear blunt or direct.  Medical documents are intended to carry relevant information, facts as evident, and the clinical opinion of the practitioner.

## 2025-07-23 NOTE — PROGRESS NOTES
Pt here for BPP  +fm noted per patient  Pt denies complaints  Pt GDM--diet controlled--OB managing

## 2025-07-23 NOTE — PROGRESS NOTES
Outpatient Maternal-Fetal Medicine Follow-Up     Dear Dr. Knight,     Thank you for requesting ultrasound evaluation and maternal fetal medicine consultation on your patient Nancy Odom.  As you are aware she is a 44/43 year old female  with a Sanchez pregnancy and an Estimated Date of Delivery: 8/3/25.  She returned to maternal-fetal medicine today for a follow-up visit.  Her history was reviewed from her prior visit and there were no interval changes.     Antepartum Risk Factors  AMA  H/o  x 2; repeat  is planned for   Gestational diabetes, A1    S:  good fetal movement.  PHYSICAL EXAMINATION:  /73 (BP Location: Right arm, Patient Position: Sitting, Cuff Size: adult)   Pulse 61   Ht 5' 8\" (1.727 m)   Wt 199 lb (90.3 kg)   LMP 2024   BMI 30.26 kg/m²   General: alert and oriented, no acute distress  Abdomen: gravid, soft, non-tender       OBSTETRIC ULTRASOUND  The patient had a follow up fetal ultrasound today which I interpreted the results and reviewed them with the patient.    Ultrasound Findings:  Single IUP in cephalic presentation.    Placenta is posterior.   A 3 vessel cord is noted.  Cardiac activity is present at 156 bpm  MVP is 4.1 cm . CALLY 13.1 cm  BPP is 8/8.       See imaging tab for the complete US report.     DISCUSSION  During her visit we discussed and reviewed the following issues:  ADVANCED MATERNAL AGE    Background  I reviewed with the patient that pregnancies in women of advanced maternal age (35 or older at delivery) are associated with elevated risks. Specifically, there is a higher rate of:  Fetal malformations  Preeclampsia  Gestational diabetes  Intrauterine fetal death    As a result, enhanced pregnancy surveillance is advised for these patients including a comprehensive ultrasound to assess for fetal malformations (at 20 weeks) and a third trimester ultrasound assessment for fetal growth (at 32 weeks). In addition,  weekly NST's (initiating at 36 weeks gestation for women 35-39 years and at 32 weeks gestation for women 40 years and older) are also advised. Routine obstetric care is more than adequate to assess for gestational diabetes and preeclampsia; hence, no further significant alterations in obstetric care are advised.   See MFM note from 3/19/2025 for detailed review    GESTATIONAL DIABETES -follow-up.  See MFM note from 2025 for detailed review    3 hour GTT  Lab Results   Component Value Date    GLUFG 82 2025    GLU1G 155 2025    GLU2G 166 (H) 2025    GLU3G 142 (H) 2025       Please see previous [facility] detailed discussion.   2025 10:44 PM CDT 96    84    69    73      2025 10:14 AM CDT 93   89    83    96      2025 10:33 PM CDT 96    93    109    93      2025  1:05 PM CDT 79   104    83    118      2025  8:43 PM CDT 86   88    86    112      2025 10:54 PM CDT 73   91    115    110      2025 10:49 PM CDT 76                       Medical Regimen Recommendation:   Continue ADA diet     Signs and symptoms of preeclampsia were discussed.  Reasons to go to labor and delivery were also discussed.  We reviewed the signs and symptoms of preeclampsia,  labor and monitoring fetal movement.  We discussed reasons for her to call her physician.           IMPRESSION:  IUP at 38w3d   Normal  testing  AMA --NIPT low risk  Prior  x 2  Gestational diabetes A1     RECOMMENDATIONS:  Continue care with Dr. Knight  Low-dose aspirin 81 mg daily  Continue four times daily capillary blood glucose assessments (fasting and 2 hour postprandial)  Weekly OB review of capillary blood glucose values  Weekly NST and increase to twice weekly  testing at 38 weeks.  This should include weekly NST and weekly NST with CALLY or BPP  Delivery at 39-40 weeks      Total time spent 20 minutes this calendar day which includes preparing to see the patient including chart  review, obtaining and/or reviewing additional medical history, performing a physical exam and evaluation, documenting clinical information in the electronic medical record, independently interpreting results, counseling the patient, communicating results to the patient/family/caregiver and coordinating care.     Case discussed with patient who demonstrated understanding and agreement with plan.     Thank you for allowing me to participate in the care of this patient.  Please feel free to contact me with any questions.    Yoly Lawson MD   Maternal-Fetal Medicine       Note to patient and family:  The 21st Century Cures Act makes medical notes available to patients in the interest of transparency.  However, please be advised that this is a medical document.  It is intended as a peer to peer communication.  It is written in medical language and may contain abbreviations or verbiage that are technical and unfamiliar.  It may appear blunt or direct.  Medical documents are intended to carry relevant information, facts as evident, and the clinical opinion of the practitioner.

## 2025-07-28 NOTE — ANESTHESIA POSTPROCEDURE EVALUATION
[facility] Jean Esquivel Patient Status:  Inpatient   Age/Gender 45 year old female MRN [id number]   Location [facility] LABOR & DELIVERY Attending Charles Maria MD    Day # 0 PCP No primary care provider on file.       Anesthesia Post-op Note     SECTION with Bilateral Salpingectomy    Procedure Summary       Date: 25 Room / Location:  L+D OR   L+D OR    Anesthesia Start: 737 Anesthesia Stop: 853    Procedures:        SECTION with Bilateral Salpingectomy      BILATERAL POST-PARTUM TUBAL LIGATION Diagnosis: (39.1 IUP repeat , AMA, and GDMA1; delivered)    Surgeons: Reza Jeter MD Anesthesiologist: Fadumo Chvaez DO    Anesthesia Type: spinal ASA Status: 2            Anesthesia Type: spinal    Vitals Value Taken Time   /110 25 08:53   Temp 97.6 25 08:53   Pulse 76 25 08:53   Resp 16 25 08:53   SpO2 99 25 08:53           Patient Location: Labor and Delivery    Anesthesia Type: spinal    Airway Patency: patent    Postop Pain Control: adequate    Mental Status: preanesthetic baseline    Nausea/Vomiting: none    Cardiopulmonary/Hydration status: stable euvolemic    Complications: no apparent anesthesia related complications    Postop vital signs: stable    Dental Exam: Unchanged from Preop    Patient to be discharged from PACU when criteria met.

## 2025-07-28 NOTE — ANESTHESIA PROCEDURE NOTES
Spinal Block    Date/Time: 7/28/2025 7:41 AM    Performed by: Fadumo Chavez DO  Authorized by: Fadumo Chavez DO      General Information and Staff    Start Time:  7/28/2025 7:41 AM  End Time:  7/28/2025 7:43 AM  Anesthesiologist:  Fadumo Chavez DO  Performed by:  Anesthesiologist  Site identification: surface landmarks    Preanesthetic Checklist: patient identified, IV checked, risks and benefits discussed, monitors and equipment checked, pre-op evaluation, timeout performed, anesthesia consent and sterile technique used      Procedure Details    Patient Position:  Sitting  Prep: ChloraPrep    Monitoring:  Cardiac monitor, heart rate and continuous pulse ox  Approach:  Midline  Location:  L3-4  Injection Technique:  Single-shot    Needle    Needle Type:  Sprotte  Needle Gauge:  24 G  Needle Length:  3.5 in    Assessment    Sensory Level:   Events: clear CSF, CSF aspirated, well tolerated and blood negative      Additional Comments

## 2025-07-28 NOTE — ANESTHESIA PREPROCEDURE EVALUATION
PRE-OP EVALUATION    Patient Name: Nancy Odom    Admit Diagnosis: Pregnancy (HCC) [Z34.90]    Pre-op Diagnosis: * No pre-op diagnosis entered *     SECTION with Bilateral Salpingectomy    Anesthesia Procedure:  SECTION with Bilateral Salpingectomy  BILATERAL POST-PARTUM TUBAL LIGATION    Surgeons and Role:     * Charles Maria MD - Primary    Pre-op vitals reviewed.  Temp: 98.3 °F (36.8 °C)  Pulse: 60  Resp: 18  BP: 122/67     Body mass index is 29.81 kg/m².    Current medications reviewed.  Hospital Medications:  Current Medications[1]    Outpatient Medications:   Prescriptions Prior to Admission[2]    Allergies: Patient has no known allergies.      Anesthesia Evaluation        Anesthetic Complications  (-) history of anesthetic complications         GI/Hepatic/Renal    Negative GI/hepatic/renal ROS.                             Cardiovascular    Negative cardiovascular ROS.                                                   Endo/Other      (+) diabetes  gestational,                          Pulmonary    Negative pulmonary ROS.                       Neuro/Psych    Negative neuro/psych ROS.                                  Past Surgical History[3]  Social Hx on file[4]  History   Drug Use Unknown     Available pre-op labs reviewed.  Lab Results   Component Value Date    WBC 8.1 2025    RBC 4.20 2025    HGB 13.0 2025    HCT 38.8 2025    MCV 92.4 2025    MCH 31.0 2025    MCHC 33.5 2025    RDW 12.6 2025    .0 2025     Lab Results   Component Value Date    GLU 91 2025             ASA: 2   Plan: spinal  NPO status verified and patient meets guidelines.  Patient has not taken beta blockers in last 24 hours.  Post-procedure pain management plan discussed with surgeon and patient.      Plan/risks discussed with: patient and spouse                Present on Admission:  **None**             [1]    [COMPLETED] lactated  ringers IV bolus 1,000 mL  1,000 mL Intravenous Once    Followed by    lactated ringers infusion  125 mL/hr Intravenous Continuous    [COMPLETED] acetaminophen (Tylenol Extra Strength) tab 1,000 mg  1,000 mg Oral Once    ondansetron (Zofran) 4 MG/2ML injection 4 mg  4 mg Intravenous Q6H PRN    [COMPLETED] sodium citrate-citric acid (Bicitra) 500-334 MG/5ML oral solution 30 mL  30 mL Oral Once    oxyTOCIN in sodium chloride 0.9% (Pitocin) 30 Units/500mL infusion premix  62.5-900 lupe-units/min Intravenous Continuous    ceFAZolin (Ancef) 2g in 10mL IV syringe premix  2 g Intravenous Once   [2]   Medications Prior to Admission   Medication Sig Dispense Refill Last Dose/Taking    prenatal vitamin with DHA 27-0.8-228 MG Oral Cap Take 1 capsule by mouth in the morning.   2025 Morning    aspirin 81 MG Oral Tab EC Take 1 tablet (81 mg total) by mouth in the morning.   Past Week    Blood Glucose Monitoring Suppl (ONETOUCH VERIO FLEX SYSTEM) w/Device Does not apply Kit Use as directed 4x per day. 1 kit 0     OneTouch Delica Lancets 33G Does not apply Misc Check 4 times per day for gestational diabetes 100 each 3     Glucose Blood (ONETOUCH VERIO) In Vitro Strip Check 4 times per day for gestational diabetes 100 strip 3     Dextromethorphan-guaiFENesin (ROBITUSSIN DM OR) Take by mouth. (Patient not taking: Reported on 2025)       METOCLOPRAMIDE 10 MG Oral Tab TAKE 1 TABLET BY MOUTH THREE TIMES A DAY AS NEEDED (Patient not taking: Reported on 2025) 30 tablet 0     ondansetron 8 MG Oral Tablet Dispersible Take 1 tablet (8 mg total) by mouth every 8 (eight) hours as needed for Nausea. (Patient not taking: Reported on 2025) 30 tablet 3     Pyridoxine HCl (VITAMIN B-6) 25 MG Oral Tab Take 1 tablet (25 mg total) by mouth daily. (Patient not taking: Reported on 2025)      [3]   Past Surgical History:  Procedure Laterality Date    Foot surgery      Hc  section level ii     [4]   Social  History  Socioeconomic History    Marital status:    Tobacco Use    Smoking status: Never    Smokeless tobacco: Never   Vaping Use    Vaping status: Never Used   Substance and Sexual Activity    Alcohol use: Not Currently     Comment: social    Drug use: Never

## (undated) NOTE — LETTER
2025      Monserrat Knight MD  [address]  [facility] [address]  Via In Basket  Patient: Nancy Odom  : 1980    Dear Colleague:  Thank you for referring your patient to me for a Maternal Fetal Medicine evaluation. Please see my attached note for my findings and recommendations.      Should you have any questions or concerns, please do not hesitate to contact me at the number listed below.    Best Regards,      Yoly Lawson MD  [facility]  [address]  [facility] [address]  [phone]    cc: No Recipients      [facility] Department of Maternal Fetal Medicine  Patient Name: Nancy Odom  Patient : 1980  Physician: Yoly Lawson MD    Pt here for Level II Ultrasound  +fm noted per patient  Pt noted vaginal pressure, pt denies bleeding, LOF, uc's.   Pt Arabic speaking, pt understanid RN questions, spouse speaks English well.    Outpatient Maternal-Fetal Medicine Consultation    Dear Dr. Knight    Thank you for requesting ultrasound evaluation and maternal fetal medicine consultation on your patient Nancy Odom.  As you are aware she is a 44 year old female  with a haro pregnancy and an Estimated Date of Delivery: 8/3/25.  A maternal-fetal medicine consultation was requested secondary to Advanced Maternal Age.  Her prenatal records and labs were reviewed.    2 prior pregnancies were uncomplicated.  She has had 2 cesareans.  ROS    HISTORY  OB History    Para Term  AB Living   4 2 2 0 1 2   SAB IAB Ectopic Multiple Live Births   1 0 0 0 2      # Outcome Date GA Lbr Gurpreet/2nd Weight Sex Type Anes PTL Lv   4 Current            3 SAB 2019        ND   2 Term 16    F Caesarean   RIKI   1 Term 14    F Caesarean None  RIKI       Allergies:  Allergies[1]   Current Meds:  Current Outpatient Medications   Medication Sig Dispense Refill    Prenatal Vit-Fe  Fumarate-FA (PRENATAL VITAMINS) 28-0.8 MG Oral Tab Take 1 tablet by mouth daily. 30 tablet 11    Dextromethorphan-guaiFENesin (ROBITUSSIN DM OR) Take by mouth.      METOCLOPRAMIDE 10 MG Oral Tab TAKE 1 TABLET BY MOUTH THREE TIMES A DAY AS NEEDED (Patient not taking: Reported on 2025) 30 tablet 0    ondansetron 8 MG Oral Tablet Dispersible Take 1 tablet (8 mg total) by mouth every 8 (eight) hours as needed for Nausea. (Patient not taking: Reported on 2025) 30 tablet 3    Pyridoxine HCl (VITAMIN B-6) 25 MG Oral Tab Take 1 tablet (25 mg total) by mouth daily. (Patient not taking: Reported on 2025)          HISTORY:  No past medical history on file.   Past Surgical History:   Procedure Laterality Date    Foot surgery      Hc  section level ii        Family History   Problem Relation Age of Onset    Cancer Father         prostate    Diabetes Mother       Social History     Socioeconomic History    Marital status:    Tobacco Use    Smoking status: Never    Smokeless tobacco: Never   Vaping Use    Vaping status: Never Used   Substance and Sexual Activity    Alcohol use: Not Currently     Comment: social    Drug use: Never          PHYSICAL EXAMINATION:  /66 (BP Location: Right arm, Patient Position: Sitting, Cuff Size: adult)   Pulse 77   Ht 5' 8\" (1.727 m)   Wt 188 lb (85.3 kg)   LMP 2024   BMI 28.59 kg/m²   Physical Exam  Constitutional:       Appearance: Normal appearance.   Abdominal:      Palpations: Abdomen is soft.      Tenderness: There is no abdominal tenderness.   Neurological:      Mental Status: She is alert.   Psychiatric:         Mood and Affect: Mood normal.         Behavior: Behavior normal.           OBSTETRIC ULTRASOUND  The patient had a level II ultrasound today which revealed size consistent with dates and a normal detailed anatomic survey. and The patient had a cervical length assessment today with a normal cervical length without cervical  funneling.  Ultrasound Findings:  Single IUP in cephalic presentation.    Placenta is posterior, low lying placenta.   A 3 vessel cord is noted.  Cardiac activity is present at 144 bpm   g ( 0 lb 11 oz);   MVP is 5 cm .     The fetal measurements are consistent with the established EDC. No ultrasound evidence of structural abnormalities are seen today. The nasal bone is present. No ultrasound evidence of markers for aneuploidy are seen. She understands that ultrasound exam cannot exclude genetic abnormalities and that genetic testing is recommended. The limitations of ultrasound were discussed.     The cervix was not able to be clearly visualized and appeared short by transabdominal ultrasound, therefore transvaginal ultrasound was performed. Cervix is closed, long and no funneling seen measuring 46.7 mm     Uterus and adnexa appeared normal  today on US  See PACS/Imaging Tab For Complete Ultrasound Report  I interpreted the results and reviewed them with the patient.    DISCUSSION  During her visit we discussed and reviewed the following issues:  ADVANCED MATERNAL AGE    Background  I reviewed with the patient that pregnancies in women of advanced maternal age (35 or older at delivery) are associated with elevated risks. Specifically, there is a higher rate of:  Fetal malformations  Preeclampsia  Gestational diabetes  Intrauterine fetal death    As a result, enhanced pregnancy surveillance is advised for these patients including a comprehensive ultrasound to assess for fetal malformations (at 20 weeks) and a third trimester ultrasound assessment for fetal growth (at 32 weeks). In addition, weekly NST's (initiating at 36 weeks gestation for women 35-39 years and at 32 weeks gestation for women 40 years and older) are also advised. Routine obstetric care is more than adequate to assess for gestational diabetes and preeclampsia; hence, no further significant alterations in obstetric care are advised.    Medical  Complications    Women 35 years of age or older can expect to experience two to three fold higher rates of hospitalization,  delivery, and pregnancy-related complications when compared to their younger counterparts.  The two most common medical problems complicating these  pregnanccies are hypertension and diabetes.   The incidence of preeclampsia in the general obstetric population is 3 to 4 percent; this increases to 5 to 10 percent in women over age 40 and is as high as 35 percent in women over age 50.   The incidence of gestational diabetes in the general obstetric population is 3 percent, rising to 7 to 12 percent in women over age 40 and 20 percent in women over age 50.  Women 35 years of age or older are more likely to be delivered by . The  delivery rate in the general obstetric population of the [place] is almost 30 percent, compared to almost 50 percent in women over age 40 to 45 and almost 80 percent in women age 50 to 63.          Fetal Death        A decision analysis tool using data from the [employer]  [employer] predicted a strategy of weekly antepartum testing and labor induction would lower the risk of unexplained fetal death in women 35 years of age or older. In this model, weekly testing starting at 36 weeks of gestation would drop the risk of fetal death from 5.2 to 1.3 per 1000 pregnancies. While a policy of antepartum testing in older women does increase the chance that a women will be induced (71 inductions per fetal death averted) and thereby increases her risk of having a  delivery, only 14 additional cesareans would need to be performed to avert one unexplained fetal death.  Hence, weekly NST's are advised for women of advanced maternal age; testing should be initiated at 36 weeks for women 35-39 years and at 32 weeks for women 40 years and older.    Fetal Malformations    Cardiac malformations, clubfoot, and diaphragmatic hernia appear  to occur with increased frequency in offspring of older women. These abnormalities are structural and unrelated to aneuploidy, thus they would not be detected by karyotype analysis.  For these reasons a complete, detailed ultrasound (level II) is advised even if the fetus has a normal karyotype.      Fetal Aneuploidy      Invasive Testing  I offered invasive genetic testing (amniocentesis, chorionic villus sampling) after reviewing the diagnostic accuracy of these tests as well as the procedure associated loss rate (1:500 for genetic amniocentesis).    She ultimately does not desire invasive genetic testing.     We discussed  the increased risk of chromosomal abnormalities associated with advanced maternal age at age  45 year old. She understands that ultrasound exam cannot exclude potential genetic abnormalities.  Her estimated risk based on maternal age at term with any chromosome abnormality is about 1:  30 and with Down Syndrome is about 1: 45.   We also discussed the risks and benefits of having  genetic testing (CVS and amniocentesis) performed.      Non-invasive Pregnancy Testing (NIPT)  I reviewed current non-invasive screening options. Currently non-invasive pregnancy testing (NIPT) offers the highest detection rate (with the lowest false positive rate) for the detection of fetal aneuploidy amongst high-risk patients. The limitations of detailed mid-trimester sonography was reviewed with the patient. First trimester screening and second trimester multiple-marker serum serum screening as alternative aneuploidy screening options were also reviewed. However, both of these tests are associated with lower detection and higher false positive rates.          Prevention of Preeclampsia    Antiplatelet Agents  The observation that preeclampsia is associated with increased platelet turnover and increased platelet-derived thromboxane levels led to randomized trials evaluating low-dose aspirin therapy in women thought  to be at increased risk of the disease. As opposed to higher dose aspirin therapy, low-dose aspirin (60 to 150 mg per day) diminishes platelet thromboxane synthesis while maintaining vascular wall prostacyclin synthesis. Although not well studied, the beneficial effect of low-dose aspirin for prevention of preeclampsia may also be in part related to modulation of inflammation. The drug has been studied for both prevention of preeclampsia and prevention of progression from mild to severe disease. It appears to result in a modest reduction in risk of preeclampsia when given to women at moderate to high risk of preeclampsia.  This approach has been studied in over 35,000 women, for both prevention of preeclampsia and prevention of progression of the disease. Low dose aspirin has a modest impact on pregnancy outcome; it reduces the risk of preeclampsia, as well as other adverse pregnancy outcomes (eg,  delivery, fetal growth restriction) by about 10 to 20 percent. Low dose aspirin is safe in pregnancy; thus, it is a reasonable strategy in women with a moderate to high risk of preeclampsia in whom the benefits outweigh the risks.     Clinical Guidelines  Based on the available data, low-dose aspirin is advised for women at high risk for preeclampsia. There is no consensus on the criteria that confer high risk. The [place] Preventive Services Task Force (USPSTF) risk criteria are reasonable.  USPSTF criteria for high risk include one or more of the following:   Previous pregnancy with preeclampsia, especially early onset and with an adverse outcome   Multifetal gestation  Chronic hypertension   Type 1 or 2 diabetes mellitus  Chronic kidney disease  Autoimmune disease (antiphospholipid syndrome, systemic lupus erythematosus)     Women with multiple moderate risk factors for preeclampsia are considered high risk, as well. Identification of women with an appropriate combination of moderate risk factors to be  considered high risk is subjective and determined case by case, as the data describing the magnitude of the association between each of these risk factors and development of preeclampsia vary widely and lack consistency. USPSTF criteria for moderate risk include:  Nulliparity  Obesity (body mass index >30 kg/m2)  Family history of preeclampsia in mother or sister  Age >=35 years  Sociodemographic characteristics (, low socioeconomic level)  Personal risk factors (eg, history of low birth weight or small for gestational age, previous adverse pregnancy outcome, >10 year pregnancy interval)     If used, daily low-dose aspirin should be initiated in the 12th or 13th week of gestation, although adverse effects from earlier initiation (eg, preconception) have not been documented. The optimum low dose is unclear; 81 mg is readily available, but 100 or 150 mg (which are not available in some countries including the [place] may be more effective.  In some pregnant women, platelet function is not inhibited by the 81 mg dose but is altered by higher dosing. Hence, current evidence has suggested a daily dose of 100 to 150 mg of aspirin rather than a lower dose. In the [place], this can be achieved by taking one and one-half 81 mg tablets; however, taking one 81 mg tablet is also reasonable since this is the commercially available dose and has proven efficacy. For prevention of preeclampsia, no trials have evaluated the efficacy of a higher dose of aspirin, which could be achieved easily by taking two 81 mg tablets or splitting a 325 mg tablet. For prevention of myocardial infarction and stroke in nonpregnant individuals, aspirin appears to be equally effective at doses between 75 and 325 mg per day.  The safety of low-dose aspirin use in the second and third trimesters is well established, but questions linger regarding use in the first trimester (possible increase in minor vaginal bleeding or  gastroschisis). Early therapy (before 16 weeks) may be important since the pathophysiologic features of preeclampsia develop at this time, weeks before clinical disease is apparent. However, available evidence is inconsistent about the importance of early initiation of therapy, possibly because aspirin has major effects on prostacyclin production and endothelial function throughout gestation.  Aspirin is discontinued 5 to 10 days before expected delivery to diminish the risk of bleeding during delivery; however, no adverse maternal or fetal effects related to low-dose aspirin have been proven.  Major questions remain as to which, if any, subgroups of women are more likely to benefit from low-dose aspirin therapy, when treatment should be started (first or second trimester), and the optimum dose to inhibit placental PGH synthase (cyclooxygenase) and also allow the anti-inflammatory effects of low-dose aspirin.        IMPRESSION:  IUP at 20w3d   AMA --NIPT low risk, declines invasive testing   Prior  x 2  Poor pregnancy history  Low lying placenta    RECOMMENDATIONS:  Continue care with Dr. Knight  Follow-up Growth ultrasound with [facility] at 32 weeks.  Weekly NST's at 36 weeks.  Low-dose aspirin 81 mg daily          Thank you for allowing me to participate in the care of your patient.  Please do not hesitate to contact me if additional questions or concerns arise.      Yoly Lawson M.D.    40 minutes spent in review of records, patient consultation, documentation and coordination of care.  The relevant clinical matter(s) are summarized above.     Note to patient and family  The 21st Century Cures Act makes medical notes available to patients in the interest of transparency.  However, please be advised that this is a medical document.  It is intended as [id number] communication.  It is written and medical language may contain abbreviations or verbiage that are technical and unfamiliar.  It may appear blunt or  direct.  Medical documents are intended to carry relevant information, facts as evident, and the clinical opinion of the practitioner.         [1] No Known Allergies

## (undated) NOTE — LETTER
2025      Monserrat Knight MD  [address]  [facility] [address]  Via In Basket  Patient: Nancy Odom  : 1980    Dear Colleague:  Thank you for referring your patient to me for a Maternal Fetal Medicine evaluation. Please see my attached note for my findings and recommendations.      Should you have any questions or concerns, please do not hesitate to contact me at the number listed below.    Best Regards,      Yoly Lawson MD  [facility]  [address]  [facility] [address]  [phone]    cc: No Recipients      [facility] Department of Maternal Fetal Medicine  Patient Name: Nancy Odom  Patient : 1980  Physician: Yoly Lawson MD    Pt here for growth ultrasound/BPP  + FM  Pt states feeling occas vaginal pressure, denies uterine cramping, vag bleeding and leaking fluid.    Outpatient Maternal-Fetal Medicine Consultation    Dear Dr. Knight    Thank you for requesting ultrasound evaluation and maternal fetal medicine consultation on your patient Nancy Odom.  As you are aware she is a 44 year old female  with a haro pregnancy and an Estimated Date of Delivery: 8/3/25.  A maternal-fetal medicine f/u is today. Her prenatal records and labs were reviewed.    2 prior pregnancies were uncomplicated.  She has had 2 cesareans.  ROS    HISTORY  OB History    Para Term  AB Living   4 2 2 0 1 2   SAB IAB Ectopic Multiple Live Births   1 0 0 0 2      # Outcome Date GA Lbr Gurpreet/2nd Weight Sex Type Anes PTL Lv   4 Current            3 SAB 2019        ND   2 Term 16    F Caesarean   RIKI   1 Term 14    F Caesarean None  RIKI       Allergies:  Allergies[1]   Current Meds:  Current Outpatient Medications   Medication Sig Dispense Refill    prenatal vitamin with DHA 27-0.8-228 MG Oral Cap Take 1 capsule by mouth in the morning.      aspirin 81 MG Oral Tab EC Take 1  tablet (81 mg total) by mouth in the morning.      Blood Glucose Monitoring Suppl (ONETOUCH VERIO FLEX SYSTEM) w/Device Does not apply Kit Use as directed 4x per day. 1 kit 0    OneTouch Delica Lancets 33G Does not apply Misc Check 4 times per day for gestational diabetes 100 each 3    Glucose Blood (ONETOUCH VERIO) In Vitro Strip Check 4 times per day for gestational diabetes 100 strip 3    Dextromethorphan-guaiFENesin (ROBITUSSIN DM OR) Take by mouth. (Patient not taking: Reported on 2025)      METOCLOPRAMIDE 10 MG Oral Tab TAKE 1 TABLET BY MOUTH THREE TIMES A DAY AS NEEDED (Patient not taking: Reported on 2025) 30 tablet 0    ondansetron 8 MG Oral Tablet Dispersible Take 1 tablet (8 mg total) by mouth every 8 (eight) hours as needed for Nausea. (Patient not taking: Reported on 2025) 30 tablet 3    Pyridoxine HCl (VITAMIN B-6) 25 MG Oral Tab Take 1 tablet (25 mg total) by mouth daily. (Patient not taking: Reported on 2025)          HISTORY:  No past medical history on file.   Past Surgical History:   Procedure Laterality Date    Foot surgery      Hc  section level ii        Family History   Problem Relation Age of Onset    Cancer Father         prostate    Diabetes Mother       Social History     Socioeconomic History    Marital status:    Tobacco Use    Smoking status: Never    Smokeless tobacco: Never   Vaping Use    Vaping status: Never Used   Substance and Sexual Activity    Alcohol use: Not Currently     Comment: social    Drug use: Never          PHYSICAL EXAMINATION:  /72 (BP Location: Right arm, Patient Position: Sitting, Cuff Size: adult)   Pulse 79   Ht 5' 8\" (1.727 m)   Wt 195 lb (88.5 kg)   LMP 2024   BMI 29.65 kg/m²   Physical Exam  Constitutional:       Appearance: Normal appearance.   Abdominal:      Palpations: Abdomen is soft.      Tenderness: There is no abdominal tenderness.   Neurological:      Mental Status: She is alert.   Psychiatric:          Mood and Affect: Mood normal.         Behavior: Behavior normal.         OBSTETRIC ULTRASOUND  The patient had a follow-up growth and BPP ultrasound today which revealed normal interval fetal growth and a BPP of 8/8.   Ultrasound Findings:  Single IUP in cephalic presentation.    Placenta is posterior.   Cardiac activity is present at 150 bpm  EFW 1839 g ( 4 lb 1 oz); 36%.    CALLY is  15.5 cm.  MVP is 5.3 cm  BPP is 8/8.     The previous low lying placenta has resolved.  The placenta tip measures 2.76 cm from the internal os.     The fetal measurements are consistent with established AARON. No gross ultrasound evidence of structural abnormalities are seen today. The patient understands that ultrasound cannot rule out all structural and chromosomal abnormalities.   See PACS/Imaging Tab For Complete Ultrasound Report  I interpreted the results and reviewed them with the patient.    DISCUSSION  During her visit we discussed and reviewed the following issues:  ADVANCED MATERNAL AGE    Background  I reviewed with the patient that pregnancies in women of advanced maternal age (35 or older at delivery) are associated with elevated risks. Specifically, there is a higher rate of:  Fetal malformations  Preeclampsia  Gestational diabetes  Intrauterine fetal death   Please see previous [facility] detailed discussion.         Prevention of Preeclampsia  Please see previous [facility] detailed discussion.       GLUCOSE 1HR OB   Date Value Ref Range Status   05/13/2025 143 (H) See comment mg/dL Final     Comment:     If the plasma glucose level measured after 1 hour is >=130, 135 or 140 mg/dl proceed to \"Glucose Tolerance, 100 gm (0 hr, 1 hr, 2hr, 3hr), Gestational (ADA)\" test on a separate day, as clinically indicated.       02/01/2025 122 <135 mg/dL Final      Lab Results   Component Value Date    GLUFG 82 05/16/2025    GLU1G 155 05/16/2025    GLU2G 166 (H) 05/16/2025    GLU3G 142 (H) 05/16/2025             She was informed of the potential  implications and risks associated with gestational diabetes (GDM) to her and her unborn child, especially when poorly controlled. We discussed about the increased incidence of macrosomia and related birth injury to her and her baby. We talked about the increased and associated risk of fetal hyperinsulinemia, jaundice, electrolyte imbalance, seizure activity, IUFD and adverse outcome. We talked about her increased risk of having diabetes later in life. The importance of good glycemic control and avoidance of prolonged hypo- and hyperglycemia was addressed.        She was instructed how to assess her blood sugar and started on a diabetic diet today. She was instructed to call us in one week or sooner about her blood sugar levels. Insulin therapy may be started depending on her blood sugar levels. I would suggest an ultrasound in the third trimester to assess growth and weekly NST by 36 weeks.      If she required insulin I would suggest growth ultrasound monthly in the third trimester; NST weekly by about 32 weeks and increase to twice weekly by 36 weeks.        Her diet was modified to provide three meals and three snacks with fewer carbohydrates.  She received instruction on self-monitoring of blood glucose.  She will be testing her blood sugars at fasting and 2 hour postprandially.   Fasting blood glucose should be less than 95 and two hour postprandial <120.         IMPRESSION:  IUP at 32w3d   AMA --NIPT low risk, declines invasive testing   Prior  x 2  Poor pregnancy history  Low lying placenta -RESOLVED  Gestational diabetes A1    RECOMMENDATIONS:  Continue care with Dr. Knight  Low-dose aspirin 81 mg daily  Continue four times daily capillary blood glucose assessments (fasting and 2 hour postprandial)  Weekly Maternal-Fetal Medicine review of capillary blood glucose values  Follow-up growth ultrasound every 4 weeks in the third trimester  Weekly NSTs    If medications are required for glucose  control:   Weekly NSTs at 32 weeks; twice weekly NST's at 34 weeks  Delivery at 39-40 weeks        Thank you for allowing me to participate in the care of your patient.  Please do not hesitate to contact me if additional questions or concerns arise.      Yoly Lawson M.D.    40 minutes spent in review of records, patient consultation, documentation and coordination of care.  The relevant clinical matter(s) are summarized above.     Note to patient and family  The 21st Century Cures Act makes medical notes available to patients in the interest of transparency.  However, please be advised that this is a medical document.  It is intended as [id number] communication.  It is written and medical language may contain abbreviations or verbiage that are technical and unfamiliar.  It may appear blunt or direct.  Medical documents are intended to carry relevant information, facts as evident, and the clinical opinion of the practitioner.                         [1]  No Known Allergies